# Patient Record
Sex: MALE | Race: WHITE | NOT HISPANIC OR LATINO | Employment: STUDENT | URBAN - METROPOLITAN AREA
[De-identification: names, ages, dates, MRNs, and addresses within clinical notes are randomized per-mention and may not be internally consistent; named-entity substitution may affect disease eponyms.]

---

## 2017-01-17 ENCOUNTER — OFFICE VISIT (OUTPATIENT)
Dept: PSYCHIATRY | Facility: CLINIC | Age: 32
End: 2017-01-17

## 2017-01-17 VITALS
HEART RATE: 132 BPM | SYSTOLIC BLOOD PRESSURE: 160 MMHG | DIASTOLIC BLOOD PRESSURE: 99 MMHG | BODY MASS INDEX: 46.05 KG/M2 | WEIGHT: 294 LBS

## 2017-01-17 DIAGNOSIS — F31.32 BIPOLAR AFFECTIVE DISORDER, CURRENTLY DEPRESSED, MODERATE: Primary | ICD-10-CM

## 2017-01-17 DIAGNOSIS — F11.20: ICD-10-CM

## 2017-01-17 DIAGNOSIS — F19.20: ICD-10-CM

## 2017-01-17 PROCEDURE — 90833 PSYTX W PT W E/M 30 MIN: CPT | Mod: PBBFAC | Performed by: PSYCHIATRY & NEUROLOGY

## 2017-01-17 PROCEDURE — 99213 OFFICE O/P EST LOW 20 MIN: CPT | Mod: S$PBB,,, | Performed by: PSYCHIATRY & NEUROLOGY

## 2017-01-17 PROCEDURE — 99999 PR PBB SHADOW E&M-EST. PATIENT-LVL III: CPT | Mod: PBBFAC,,, | Performed by: PSYCHIATRY & NEUROLOGY

## 2017-01-17 PROCEDURE — 99213 OFFICE O/P EST LOW 20 MIN: CPT | Mod: PBBFAC | Performed by: PSYCHIATRY & NEUROLOGY

## 2017-01-17 PROCEDURE — 90833 PSYTX W PT W E/M 30 MIN: CPT | Mod: S$PBB,,, | Performed by: PSYCHIATRY & NEUROLOGY

## 2017-01-17 RX ORDER — CITALOPRAM 20 MG/1
20 TABLET, FILM COATED ORAL DAILY
Qty: 90 TABLET | Refills: 0 | Status: SHIPPED | OUTPATIENT
Start: 2017-01-17 | End: 2017-03-14

## 2017-01-17 RX ORDER — LAMOTRIGINE 100 MG/1
100 TABLET ORAL DAILY
Qty: 30 TABLET | Refills: 1 | Status: SHIPPED | OUTPATIENT
Start: 2017-02-18 | End: 2017-03-14 | Stop reason: SDUPTHER

## 2017-01-17 RX ORDER — LAMOTRIGINE 25 MG/1
25 TABLET ORAL DAILY
Qty: 60 TABLET | Refills: 0 | Status: SHIPPED | OUTPATIENT
Start: 2017-01-17 | End: 2017-02-18

## 2017-01-17 NOTE — LETTER
January 24, 2017      Elias Fink MD  [address]  [facility] [address]           [facility] - Child Psychiatry  [address]  [facility] [address]  Phone: [phone]          Patient: Warren Berg   MR Number: [id number]   YOB: 1985   Date of Visit: 1/17/2017       Dear Dr. Elias Fink:    Thank you for referring Warren Berg to me for evaluation. Attached you will find relevant portions of my assessment and plan of care.    If you have questions, please do not hesitate to call me. I look forward to following Warren Berg along with you.    Sincerely,    Alejo Denney MD    Enclosure  CC:  No Recipients    If you would like to receive this communication electronically, please contact [email or web address] or [phone] to request more information on [employer] access.    For providers and/or their staff who would like to refer a patient to [facility], please contact us through our one-stop-shop provider referral line, Luis Daniel Dubon, at [phone].    If you feel you have received this communication in error or would no longer like to receive these types of communications, please e-mail [email or web address]

## 2017-01-17 NOTE — PROGRESS NOTES
"Outpatient Psychiatry Follow-Up Visit (MD/NP)    1/17/2017    Clinical Status of Patient:  Outpatient (Ambulatory)    Chief Complaint:  Warren Berg is a 31 y.o. male who presents today for follow-up of Mood Disorder and Mood Intensity dysregulation    Met with patient.      Interval History and Content of Current Session:           Patient reports depression has recurred: depressed mood, loss of please, no energy, sleeping over 12 hours a day, feeling helpless to do much about this. (Even his new photo for this EHR, taken today, looks depressed.) Denies any suicidal thoughts, "I've been through too much already to do that," no aggressive thoughts, and no hopelessness. We agree that this is a recurrence for him despite lamotrigine prophylaxis and agree to resume Emsam that has been very helpful in recovering him in the past. Tells me he still has access to the Patient Assistance Program from the . Inquires whether I am willing to increase Adderall to improve his energy and I tell him I am not. He is accepting of same. We discussed safety procedures and access to emergency services if needed.         Pertinent stressor is a relationship loss with GF of something between 6-12 months. He did not see this coming.        Re prompt reassessment, he says his mother has an appt in this clinic next week and I agree to see him the same day and time due to their very long transportation time here.    PSYCHOTHERAPY  Site: [facility], [address]  Time: 21 minutes  Participants: Met with patient  Therapeutic Intervention Type: supportive psychotherapy  Why chosen therapy is appropriate versus another modality: patient responds to this modality  Target symptoms: distractability, anxiety , mood disorder  Primary focus: resumption of care  Psychotherapeutic techniques: ventilation, clarification  Outcome monitoring methods: self-report, observation, feedback from family  Patient's response to " intervention:  The patient's response to intervention is motivated.  Progress toward goals:  The patient's progress toward goals is limited.    Past Medical History   Diagnosis Date    ADD (attention deficit disorder) 7/27/2012    ADHD (attention deficit hyperactivity disorder)     Bipolar affective disorder, depressed in partial remission     Angela     Migraine     Psychiatric exam      Medications  Current Outpatient Prescriptions on File Prior to Visit   Medication Sig Dispense Refill    hydrochlorothiazide (HYDRODIURIL) 25 MG tablet Take 25 mg by mouth once daily.        losartan (COZAAR) 100 MG tablet   5    triamterene (DYRENIUM) 50 MG Cap Take 50 mg by mouth once daily.        [DISCONTINUED] lamotrigine (LAMICTAL) 200 MG tablet Take 1 tablet (200 mg total) by mouth once daily. 30 tablet 3     No current facility-administered medications on file prior to visit.        Compliance: In care home (up til 2 days ago) he was taking lamotrigine 100 mg daily. Since retturn home he has been taking the meds as prescribed here.  Side effects: None    Risk Parameters:  Patient reports no suicidal ideation  Patient reports no homicidal ideation  Patient reports no self-injurious behavior  Patient reports no violent behavior    EXAM:   weight is 133.4 kg (294 lb). His blood pressure is 160/99 (abnormal) and his pulse is 132 (abnormal).     Mental Status Evaluation     Appearance:  Obese, casually but neatly dressed today, full beard   Behavior:  cooperative   Speech:  normal rate, normal volume, spontaneous   Mood:  anxious   Affect:  congruent and appropriate   Thought Process:  normal and logical, goal-directed   Thought Content:  normal, no suicidality, no homicidality, delusions, or paranoia   Sensorium:  person, place, time/date, situation   Attention Span & Concentration able to focus, completed tasks   Cognition:  grossly intact   Insight:  poor   Judgment:  impaired due to impulsivity and denial      Diagnosis:  Axis I:   1. Bipolar affective disorder, currently depressed, moderate  lamotrigine (LAMICTAL) 25 MG tablet   2. 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    Plan:  · Medication Management:  Continue current medications. The risks and benefits of medication were discussed with the patient. Add new (old, actually but has been off it for over a year) Emsam 6 mg daily, increase to 9 mg next week if he is tolerating it   · Supportive psychotherapy  · Get collateral from mother at next visit, assure safety net     Return to Clinic: 2 months

## 2017-01-17 NOTE — MR AVS SNAPSHOT
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       These Medications        Disp Refills Start End    lamotrigine (LAMICTAL) 25 MG tablet 60 tablet 0 2017    Take 1 tablet (25 mg total) by mouth once daily. For 14 days, then 2 tablets daily for 14 days, then 4 tablets daily - Oral    [facility] [address] [place] [address] [address] [place] [address] Ph #: [phone]       lamotrigine (LAMICTAL) 100 MG tablet 30 tablet 1 2017     Take 1 tablet (100 mg total) by mouth once daily. Starting on 2017 - Oral    [facility] [address] [place] [address] [place] [address] Ph #: [phone]       citalopram (CELEXA) 20 MG tablet 90 tablet 0 2017     Take 1 tablet (20 mg total) by mouth once daily. - Oral    Pharmacy: [facility] [address] [place] [address] [place] - [address] [place] [address] Ph #: [phone]         [facility] On Call     [facility] On Call Nurse Care Line -  Assistance  Registered nurses in the [facility] On Call Center provide clinical advisement, health education, appointment booking, and other advisory services.  Call for this free service at [phone].              Medications           Message regarding Medications     Verify the changes and/or additions to your medication regime listed below are the same as discussed with your clinician today.  If any of these changes or additions are incorrect, please notify your healthcare provider.        START taking these NEW medications        Refills    lamotrigine (LAMICTAL) 100 MG tablet 1    Starting on: 2/18/2017    Sig: Take 1 tablet (100 mg total) by mouth once daily. Starting on 2/18/2017    Class: Normal    Route: Oral    citalopram (CELEXA) 20 MG tablet 0    Sig: Take 1 tablet (20 mg total) by mouth once daily.    Class: Normal    Route: Oral      CHANGE how you are taking these medications     Start Taking Instead of    lamotrigine (LAMICTAL) 25 MG tablet lamotrigine (LAMICTAL) 200 MG tablet    Dosage:  Take 1 tablet (25 mg total) by mouth once daily. For 14 days, then 2 tablets daily for 14 days, then 4 tablets daily Dosage:  Take 1 tablet (200 mg total) by mouth once daily.    Reason for Change:  Reorder       STOP taking these medications     alprazolam (XANAX) 2 MG Tab 1 tab by mouth at 10 am, one tab at 4 pm, and 2 tabs at bedtime    selegiline (EMSAM) 9 mg/24 hr Place 1 patch onto the skin once daily.           Verify that the below list of medications is an accurate representation of the medications you are currently taking.  If none reported, the list may be blank. If incorrect, please contact your healthcare provider. Carry this list with you in case of emergency.           Current Medications     citalopram (CELEXA) 20 MG tablet Take 1 tablet (20 mg total) by mouth once daily.    hydrochlorothiazide (HYDRODIURIL) 25 MG tablet Take 25 mg by mouth once daily.      lamotrigine (LAMICTAL) 100 MG tablet Starting on Feb 18, 2017. Take 1 tablet (100 mg total) by mouth once daily. Starting on 2/18/2017    lamotrigine (LAMICTAL) 25 MG tablet Take 1 tablet (25 mg total) by mouth once daily. For 14 days, then 2 tablets  daily for 14 days, then 4 tablets daily    losartan (COZAAR) 100 MG tablet     triamterene (DYRENIUM) 50 MG Cap Take 50 mg by mouth once daily.             Clinical Reference Information           Vital Signs - Last Recorded  Most recent update: 1/17/2017  1:32 PM by Ward Cannon    BP Pulse Wt BMI       (!) 160/99 (!) 132 133.4 kg (294 lb) 46.05 kg/m2       Blood Pressure          Most Recent Value    BP  (!)  160/99      Allergies as of 1/17/2017     No Known Allergies      Immunizations Administered on Date of Encounter - 1/17/2017     None      [facility] Sign-Up     Activating your [facility] account is as easy as 1-2-3!     1) Visit [email or web address], select Sign Up Now, enter this activation code and your date of birth, then select Next.  [email or web address]  Expires: 3/3/2017  2:06 PM      2) Create a username and password to use when you visit [facility] in the future and select a security question in case you lose your password and select Next.    3) Enter your e-mail address and click Sign Up!    Additional Information  If you have questions, please e-mail [email or web address] or call [phone] to talk to our [facility] staff. Remember, [facility] is NOT to be used for urgent needs. For medical emergencies, dial 911.

## 2017-03-14 ENCOUNTER — OFFICE VISIT (OUTPATIENT)
Dept: PSYCHIATRY | Facility: CLINIC | Age: 32
End: 2017-03-14

## 2017-03-14 VITALS
HEART RATE: 113 BPM | DIASTOLIC BLOOD PRESSURE: 81 MMHG | SYSTOLIC BLOOD PRESSURE: 135 MMHG | WEIGHT: 301 LBS | HEIGHT: 67 IN | BODY MASS INDEX: 47.24 KG/M2

## 2017-03-14 DIAGNOSIS — F19.21 POLYSUBSTANCE DEPENDENCE, NON-OPIOID, IN REMISSION: ICD-10-CM

## 2017-03-14 DIAGNOSIS — F31.75 BIPOLAR DISORDER, IN PARTIAL REMISSION, MOST RECENT EPISODE DEPRESSED: Primary | ICD-10-CM

## 2017-03-14 PROBLEM — F31.70 BIPOLAR DISORDER IN PARTIAL REMISSION: Status: ACTIVE | Noted: 2017-03-14

## 2017-03-14 PROCEDURE — 90833 PSYTX W PT W E/M 30 MIN: CPT | Mod: PBBFAC | Performed by: PSYCHIATRY & NEUROLOGY

## 2017-03-14 PROCEDURE — 99213 OFFICE O/P EST LOW 20 MIN: CPT | Mod: S$PBB,,, | Performed by: PSYCHIATRY & NEUROLOGY

## 2017-03-14 PROCEDURE — 99999 PR PBB SHADOW E&M-EST. PATIENT-LVL III: CPT | Mod: PBBFAC,,, | Performed by: PSYCHIATRY & NEUROLOGY

## 2017-03-14 PROCEDURE — 90833 PSYTX W PT W E/M 30 MIN: CPT | Mod: S$PBB,,, | Performed by: PSYCHIATRY & NEUROLOGY

## 2017-03-14 PROCEDURE — 99213 OFFICE O/P EST LOW 20 MIN: CPT | Mod: PBBFAC | Performed by: PSYCHIATRY & NEUROLOGY

## 2017-03-14 RX ORDER — RIZATRIPTAN BENZOATE 10 MG/1
TABLET, ORALLY DISINTEGRATING ORAL
Refills: 0 | COMMUNITY
Start: 2016-12-05 | End: 2019-09-04 | Stop reason: ALTCHOICE

## 2017-03-14 RX ORDER — LAMOTRIGINE 150 MG/1
150 TABLET ORAL DAILY
Qty: 30 TABLET | Refills: 5 | Status: SHIPPED | OUTPATIENT
Start: 2017-03-14 | End: 2017-08-17 | Stop reason: SDUPTHER

## 2017-03-14 RX ORDER — MIRTAZAPINE 30 MG/1
30 TABLET, FILM COATED ORAL NIGHTLY
Qty: 30 TABLET | Refills: 5 | Status: SHIPPED | OUTPATIENT
Start: 2017-03-14 | End: 2017-05-15 | Stop reason: SINTOL

## 2017-03-14 NOTE — MR AVS SNAPSHOT
[facility] Child Psychiatry  [address]  [facility] [address]  Phone: [phone]                  Warren Berg   3/14/2017 1:30 PM   Office Visit    Description:  Male : 1985   Provider:  Alejo Denney MD   Department:  [facility] - Child Psychiatry           Reason for Visit     Mood Disorder     Anxiety           Diagnoses this Visit        Comments    Bipolar disorder, in partial remission, most recent episode depressed    -  Primary     Polysubstance dependence, non-opioid, in remission                To Do List           Goals (5 Years of Data)     None      Follow-Up and Disposition     Return in about 2 months (around 2017) for evaluation and management with psychotherapy.       These Medications        Disp Refills Start End    lamotrigine (LAMICTAL) 150 MG Tab 30 tablet 5 3/14/2017     Take 1 tablet (150 mg total) by mouth once daily. - Oral    [facility] [address] [facility] Ph #: [phone]       mirtazapine (REMERON) 30 MG tablet 30 tablet 5 3/14/2017     Take 1 tablet (30 mg total) by mouth every evening. - Oral    Pharmacy: [place], [place] - [address] [facility] Ph #: [phone]         [facility] On Call     [facility] On Call Nurse Care Line -  Assistance  Registered nurses in the [facility] On Call Center provide clinical advisement, health education, appointment booking, and other advisory services.  Call for this free service at [phone].             Medications           Message regarding Medications     Verify the changes and/or additions to your medication regime listed below are the same as discussed with your clinician today.  If any of these changes or additions are incorrect, please notify your healthcare provider.        START taking these NEW medications        Refills    mirtazapine (REMERON) 30 MG tablet 5    Sig: Take 1 tablet (30 mg total) by mouth every evening.    Class: Normal    Route:  "Oral      CHANGE how you are taking these medications     Start Taking Instead of    lamotrigine (LAMICTAL) 150 MG Tab lamotrigine (LAMICTAL) 100 MG tablet    Dosage:  Take 1 tablet (150 mg total) by mouth once daily. Dosage:  Take 1 tablet (100 mg total) by mouth once daily. Starting on 2/18/2017    Reason for Change:  Reorder       STOP taking these medications     citalopram (CELEXA) 20 MG tablet Take 1 tablet (20 mg total) by mouth once daily.           Verify that the below list of medications is an accurate representation of the medications you are currently taking.  If none reported, the list may be blank. If incorrect, please contact your healthcare provider. Carry this list with you in case of emergency.           Current Medications     hydrochlorothiazide (HYDRODIURIL) 25 MG tablet Take 25 mg by mouth once daily.      lamotrigine (LAMICTAL) 150 MG Tab Take 1 tablet (150 mg total) by mouth once daily.    losartan (COZAAR) 100 MG tablet     mirtazapine (REMERON) 30 MG tablet Take 1 tablet (30 mg total) by mouth every evening.    rizatriptan (MAXALT-MLT) 10 MG disintegrating tablet     triamterene (DYRENIUM) 50 MG Cap Take 50 mg by mouth once daily.             Clinical Reference Information           Your Vitals Were     BP Pulse Height Weight BMI    135/81 113 5' 7" (1.702 m) 136.5 kg (301 lb) 47.14 kg/m2      Blood Pressure          Most Recent Value    BP  135/81      Allergies as of 3/14/2017     No Known Allergies      Immunizations Administered on Date of Encounter - 3/14/2017     None      [facility] Sign-Up     Activating your [facility] account is as easy as 1-2-3!     1) Visit [email or web address], select Sign Up Now, enter this activation code and your date of birth, then select Next.  [email or web address]  Expires: 4/28/2017  2:03 PM      2) Create a username and password to use when you visit [facility] in the future and select a security question in case you lose your password and select Next.    3) Enter " your e-mail address and click Sign Up!    Additional Information  If you have questions, please e-mail [email or web address] or call [phone] to talk to our [facility] staff. Remember, [facility] is NOT to be used for urgent needs. For medical emergencies, dial 911.         Language Assistance Services     ATTENTION: Language assistance services are available, free of charge. Please call [phone].      ATENCIÓN: Si habla español, tiene a gaffney disposición servicios gratuitos de asistencia lingüística. Llame al [phone].     CHÚ Ý: N?u b?n nói Ti?ng Vi?t, có các d?ch v? h? tr? ngôn ng? mi?n phí dành cho b?n. G?i s? [phone].         [facility] complies with applicable Federal civil rights laws and does not discriminate on the basis of race, color, national origin, age, disability, or sex.

## 2017-03-14 NOTE — PROGRESS NOTES
"Outpatient Psychiatry Follow-Up Visit (MD/NP)    3/14/2017    Clinical Status of Patient:  Outpatient (Ambulatory)    Chief Complaint:  Warren Berg is a 31 y.o. male who presents today for follow-up of Mood Disorder and Anxiety    Met with patient.      Interval History and Content of Current Session:           Patient reports depression has recurred: depressed mood, loss of please, no energy, sleeping over 12 hours a day, feeling helpless to do much about this. (Even his new photo for this EHR, taken today, looks depressed.) Denies any suicidal thoughts, "I've been through too much already to do that," no aggressive thoughts, and no hopelessness. We agree that this is a recurrence for him despite lamotrigine prophylaxis and agree to resume Emsam that has been very helpful in recovering him in the past. Tells me he still has access to the Patient Assistance Program from the . Inquires whether I am willing to increase Adderall to improve his energy and I tell him I am not. He is accepting of same. We discussed safety procedures and access to emergency services if needed.         Pertinent stressor is a relationship loss with GF of something between 6-12 months. He did not see this coming.        Re prompt reassessment, he says his mother has an appt in this clinic next week and I agree to see him the same day and time due to their very long transportation time here.    PSYCHOTHERAPY  Site: [facility] [address]  Time: 21 minutes  Participants: Met with patient  Therapeutic Intervention Type: supportive psychotherapy  Why chosen therapy is appropriate versus another modality: patient responds to this modality  Target symptoms: distractability, anxiety , mood disorder  Primary focus: resumption of care  Psychotherapeutic techniques: ventilation, clarification  Outcome monitoring methods: self-report, observation, feedback from family  Patient's response to intervention:  The patient's " "response to intervention is motivated.  Progress toward goals:  The patient's progress toward goals is limited.    Past Medical History:   Diagnosis Date    ADD (attention deficit disorder) 7/27/2012    ADHD (attention deficit hyperactivity disorder)     Bipolar affective disorder, depressed in partial remission     Angela     Migraine     Psychiatric exam      Medications  Current Outpatient Prescriptions on File Prior to Visit   Medication Sig Dispense Refill    hydrochlorothiazide (HYDRODIURIL) 25 MG tablet Take 25 mg by mouth once daily.        losartan (COZAAR) 100 MG tablet   5    triamterene (DYRENIUM) 50 MG Cap Take 50 mg by mouth once daily.        [DISCONTINUED] citalopram (CELEXA) 20 MG tablet Take 1 tablet (20 mg total) by mouth once daily. 90 tablet 0    [DISCONTINUED] lamotrigine (LAMICTAL) 100 MG tablet Take 1 tablet (100 mg total) by mouth once daily. Starting on 2/18/2017 30 tablet 1     No current facility-administered medications on file prior to visit.        Compliance: taking as prescribed, he says  Side effects: None    Risk Parameters:  Patient reports no suicidal ideation  Patient reports no homicidal ideation  Patient reports no self-injurious behavior  Patient reports no violent behavior    EXAM:   height is 5' 7" (1.702 m) and weight is 136.5 kg (301 lb) (abnormal). His blood pressure is 135/81 and his pulse is 113 (abnormal).     Mental Status Evaluation     Appearance:  Obese, casually but neatly dressed today, therese and hair well groomed   Behavior:  cooperative, mildly manipulative   Speech:  spontaneous, conversational rate and volume   Mood:  anxious   Affect:  congruent and appropriate   Thought Process:  normal and logical, goal-directed   Thought Content:  normal, no suicidality, no homicidality, delusions, or paranoia   Sensorium:  person, place, time/date, situation   Attention Span & Concentration able to focus, completed tasks   Cognition:  grossly intact "   Insight:  poor   Judgment:  impaired due to impulsivity and denial     Diagnosis:  Axis I:   1. Bipolar disorder, in partial remission, most recent episode depressed  lamotrigine (LAMICTAL) 150 MG Tab    mirtazapine (REMERON) 30 MG tablet   2. 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    Plan:  · Medication Management:  The risks and benefits of medication were discussed with the patient. 1. increase lamotrigine to 150 mg daily 2. discontinue citalopram 3. trial mirtazapine 30 mg daily   · Supportive psychotherapy  · Collateral from mother reviewed: support system of friends and household is substantially improved    Return to Clinic: 2 months

## 2017-05-15 ENCOUNTER — OFFICE VISIT (OUTPATIENT)
Dept: PSYCHIATRY | Facility: CLINIC | Age: 32
End: 2017-05-15
Payer: MEDICAID

## 2017-05-15 VITALS
SYSTOLIC BLOOD PRESSURE: 180 MMHG | HEART RATE: 104 BPM | WEIGHT: 306 LBS | DIASTOLIC BLOOD PRESSURE: 117 MMHG | HEIGHT: 67 IN | BODY MASS INDEX: 48.03 KG/M2

## 2017-05-15 DIAGNOSIS — F19.21 POLYSUBSTANCE DEPENDENCE, NON-OPIOID, IN REMISSION: ICD-10-CM

## 2017-05-15 DIAGNOSIS — F10.10 ALCOHOL ABUSE: ICD-10-CM

## 2017-05-15 DIAGNOSIS — F98.8 ADD (ATTENTION DEFICIT DISORDER): Chronic | ICD-10-CM

## 2017-05-15 DIAGNOSIS — F31.75 BIPOLAR DISORDER, IN PARTIAL REMISSION, MOST RECENT EPISODE DEPRESSED: Primary | ICD-10-CM

## 2017-05-15 PROCEDURE — 90833 PSYTX W PT W E/M 30 MIN: CPT | Mod: S$PBB,HB,AF, | Performed by: PSYCHIATRY & NEUROLOGY

## 2017-05-15 PROCEDURE — 99213 OFFICE O/P EST LOW 20 MIN: CPT | Mod: PBBFAC | Performed by: PSYCHIATRY & NEUROLOGY

## 2017-05-15 PROCEDURE — 90833 PSYTX W PT W E/M 30 MIN: CPT | Mod: PBBFAC | Performed by: PSYCHIATRY & NEUROLOGY

## 2017-05-15 PROCEDURE — 99999 PR PBB SHADOW E&M-EST. PATIENT-LVL III: CPT | Mod: PBBFAC,,, | Performed by: PSYCHIATRY & NEUROLOGY

## 2017-05-15 PROCEDURE — 99213 OFFICE O/P EST LOW 20 MIN: CPT | Mod: S$PBB,HB,AF, | Performed by: PSYCHIATRY & NEUROLOGY

## 2017-05-15 RX ORDER — DULOXETIN HYDROCHLORIDE 60 MG/1
60 CAPSULE, DELAYED RELEASE ORAL DAILY
Qty: 30 CAPSULE | Refills: 2 | Status: SHIPPED | OUTPATIENT
Start: 2017-06-13 | End: 2017-08-17

## 2017-05-15 RX ORDER — DULOXETIN HYDROCHLORIDE 30 MG/1
30 CAPSULE, DELAYED RELEASE ORAL DAILY
Qty: 30 CAPSULE | Refills: 0 | Status: SHIPPED | OUTPATIENT
Start: 2017-05-15 | End: 2017-06-14
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
## 2017-05-15 NOTE — MR AVS SNAPSHOT
[facility] - Child Psychiatry  [address]  [facility] [address]  Phone: [phone]                  Warren Berg   5/15/2017 10:30 AM   Office Visit    Description:  Male : 1985   Provider:  Alejo Denney MD   Department:  [facility] - Child Psychiatry           Reason for Visit     Mood Disorder           Diagnoses this Visit        Comments    Bipolar disorder, in partial remission, most recent episode depressed    -  Primary     ADD (attention deficit disorder)         Polysubstance dependence, non-opioid, in remission                To Do List           Goals (5 Years of Data)     None      Follow-Up and Disposition     Return in about 6 weeks (around 2017) for f/u of medication and developmental progress.       These Medications        Disp Refills Start End    duloxetine (CYMBALTA) 30 MG capsule 30 capsule 0 5/15/2017 2017    Take 1 capsule (30 mg total) by mouth once daily. - Oral    Pharmacy: [facility] [place] - [address] [facility] #: [phone]       duloxetine (CYMBALTA) 60 MG capsule 30 capsule 2 2017     Take 1 capsule (60 mg total) by mouth once daily. Starting 2017 - Oral    Pharmacy: [place], [place] - [address] [facility] #: [phone]         [facility]     [facility] Care Line -  Assistance  Unless otherwise directed by your provider, please contact [facility], our nurse care line that is available for  assistance.     Registered nurses in the [facility] provide: appointment scheduling, clinical advisement, health education, and other advisory services.  Call: [phone] (toll free)               Medications           Message regarding Medications     Verify the changes and/or additions to your medication regime listed below are the same as discussed with your clinician today.  If any of these changes or additions are incorrect, please notify your healthcare  "provider.        START taking these NEW medications        Refills    duloxetine (CYMBALTA) 30 MG capsule 0    Sig: Take 1 capsule (30 mg total) by mouth once daily.    Class: Normal    Route: Oral    duloxetine (CYMBALTA) 60 MG capsule 2    Starting on: 6/13/2017    Sig: Take 1 capsule (60 mg total) by mouth once daily. Starting 6/13/2017    Class: Normal    Route: Oral      STOP taking these medications     mirtazapine (REMERON) 30 MG tablet Take 1 tablet (30 mg total) by mouth every evening.           Verify that the below list of medications is an accurate representation of the medications you are currently taking.  If none reported, the list may be blank. If incorrect, please contact your healthcare provider. Carry this list with you in case of emergency.           Current Medications     duloxetine (CYMBALTA) 30 MG capsule Take 1 capsule (30 mg total) by mouth once daily.    duloxetine (CYMBALTA) 60 MG capsule Starting on Jun 13, 2017. Take 1 capsule (60 mg total) by mouth once daily. Starting 6/13/2017    hydrochlorothiazide (HYDRODIURIL) 25 MG tablet Take 25 mg by mouth once daily.      lamotrigine (LAMICTAL) 150 MG Tab Take 1 tablet (150 mg total) by mouth once daily.    losartan (COZAAR) 100 MG tablet     rizatriptan (MAXALT-MLT) 10 MG disintegrating tablet     triamterene (DYRENIUM) 50 MG Cap Take 50 mg by mouth once daily.             Clinical Reference Information           Your Vitals Were     BP Pulse Height Weight BMI    180/117 104 5' 7" (1.702 m) 138.8 kg (306 lb) 47.93 kg/m2      Blood Pressure          Most Recent Value    BP  (!)  180/117      Allergies as of 5/15/2017     No Known Allergies      Immunizations Administered on Date of Encounter - 5/15/2017     None      [facility] Sign-Up     Activating your [facility] account is as easy as 1-2-3!     1) Visit [email or web address], select Sign Up Now, enter this activation code and your date of birth, then select Next.  [email or web address]  Expires: " 6/29/2017 11:03 AM      2) Create a username and password to use when you visit [facility] in the future and select a security question in case you lose your password and select Next.    3) Enter your e-mail address and click Sign Up!    Additional Information  If you have questions, please e-mail [email or web address] or call [phone] to talk to our [facility] staff. Remember, [facility] is NOT to be used for urgent needs. For medical emergencies, dial 911.         Language Assistance Services     ATTENTION: Language assistance services are available, free of charge. Please call [phone].      ATENCIÓN: Si habla miguel angel, tiene a gaffney disposición servicios gratuitos de asistencia lingüística. Llame al [phone].     CHÚ Ý: N?u b?n nói Ti?ng Vi?t, có các d?ch v? h? tr? ngôn ng? mi?n phí dành cho b?n. G?i s? [phone].         [facility] complies with applicable Federal civil rights laws and does not discriminate on the basis of race, color, national origin, age, disability, or sex.

## 2017-08-17 ENCOUNTER — OFFICE VISIT (OUTPATIENT)
Dept: PSYCHIATRY | Facility: CLINIC | Age: 32
End: 2017-08-17
Payer: MEDICAID

## 2017-08-17 DIAGNOSIS — F90.9 ATTENTION DEFICIT HYPERACTIVITY DISORDER (ADHD), UNSPECIFIED ADHD TYPE: Primary | Chronic | ICD-10-CM

## 2017-08-17 DIAGNOSIS — F19.21 POLYSUBSTANCE DEPENDENCE, NON-OPIOID, IN REMISSION: ICD-10-CM

## 2017-08-17 DIAGNOSIS — F31.75 BIPOLAR DISORDER, IN PARTIAL REMISSION, MOST RECENT EPISODE DEPRESSED: ICD-10-CM

## 2017-08-17 PROCEDURE — 99214 OFFICE O/P EST MOD 30 MIN: CPT | Mod: AF,HB,S$PBB, | Performed by: PSYCHIATRY & NEUROLOGY

## 2017-08-17 PROCEDURE — 3008F BODY MASS INDEX DOCD: CPT | Mod: ,,, | Performed by: PSYCHIATRY & NEUROLOGY

## 2017-08-17 RX ORDER — LAMOTRIGINE 150 MG/1
TABLET ORAL
Qty: 60 TABLET | Refills: 2 | Status: SHIPPED | OUTPATIENT
Start: 2017-08-17 | End: 2017-11-02 | Stop reason: SDUPTHER

## 2017-08-17 NOTE — PROGRESS NOTES
Outpatient Psychiatry Follow-Up Visit (MD/NP)    8/17/2017    Clinical Status of Patient:  Outpatient (Ambulatory)    Chief Complaint:  Warren Berg is a 31 y.o. male who presents today for follow-up of bipolar disorder, adhd, substance use disorders. .  Met with patient and his mother.      Interval History and Content of Current Session:  Reviewed records from Dr. Alejo Denney who has been patient's psychiatrist since ~2001, last seen in May '17, though records available at this visit only available back to 2012. Patient has also received some mental health care during incarceration. Patient reported desire to switch psychiatrists due to ongoing symptoms, conflict with regard to patient's preferred treatments due to history of substance abuse and drug convictions (first for meth manufacturing, though they say he was convicted for roommate's conduct, and 2nd charge a drug accessory charge that was later dropped). Patient reports problems with chronic mood instability, attention and concentration back to childhood, distractibility, panic attacks, organization problems, difficulty completing tasks, anergia, poor motivation, depression, sleep disturbance. He reported drinking too much at last psych visit, occasional MJ use, denies those to me. Reports most recent meds were lamotrigine, duloxetine, that these medications have been helpful but that he's hoping to restart a psychostimulant dexadrine which Dr. Denney has not been agreeable to restart (Dr. Denney's recent notes don't reflect document why specifically but last script seems to have come in February 2016). Also takes losartan, HCTZ, regularly, maxalt prn. In addition to ongoing inattention and related symptoms as above he also endorses problems with irritability and depression, that these have previously been helped with lamotrigine at higher doses.     Review of Systems   Review of Systems   Constitutional: Negative for chills, fever,  malaise/fatigue and weight loss.   HENT: Negative.    Eyes: Negative.    Respiratory: Negative for cough, shortness of breath and wheezing.    Cardiovascular: Negative for chest pain.   Gastrointestinal: Negative for abdominal pain, constipation, diarrhea, heartburn, nausea and vomiting.   Musculoskeletal: Negative for myalgias.   Skin: Negative for rash.   Neurological: Negative for dizziness, tremors, focal weakness and headaches.   Psychiatric/Behavioral: Positive for depression. Negative for hallucinations, substance abuse and suicidal ideas. The patient is nervous/anxious.        Past Medical, Family and Social History: The patient's past medical, family and social history have been reviewed and updated as appropriate within the electronic medical record - see encounter notes.    Compliance: yes    Side effects: None    Risk Parameters:  Patient reports no suicidal ideation  Patient reports no homicidal ideation  Patient reports no self-injurious behavior  Patient reports no violent behavior    Exam (detailed: at least 9 elements; comprehensive: all 15 elements)   Constitutional  Vitals:  Most recent vital signs, dated less than 90 days prior to this appointment, were not reviewed.   There were no vitals filed for this visit.     General:  unremarkable, age appropriate     Musculoskeletal  Muscle Strength/Tone:  no tremor, no tic   Gait & Station:  non-ataxic     Psychiatric  Appearance: casually dressed & groomed;   Behavior: calm,   Cooperation: cooperative with assessment  Speech: normal rate, volume, tone  Thought Process: linear, goal-directed  Thought Content: No suicidal or homicidal ideation; no delusions  Affect: normal range  Mood: euthymic  Perceptions: No auditory or visual hallucinations  Level of Consciousness: alert throughout interview  Insight: fair  Cognition: Oriented to person, place, time, & situation  Memory: no apparent deficits to general clinical interview; not formally  assessed  Attention/Concentration: no apparent deficits to general clinical interview; not formally assessed  Fund of Knowledge: average by vocabulary/education    Assessment and Diagnosis   Status/Progress: Based on the examination today, the patient's problem(s) is/are inadequately controlled.  New problems have not been presented today.   Co-morbidities and Diagnostic uncertainty are complicating management of the primary condition.  The working differential for this patient includes bipolar disorder, adhd, anxiety disorders, personality disorders, substance use disorders.     General Impression: This 32 y/o patient with past history of bipolar disorder, adhd, substance use disorders (most recently alcohol by chart) presents for transfer of care, hoping to restart dexadrine, alprazolam. Explained that given hx of two drug-related arrests including one for meth manufacturing would need to speak to Dr. Denney who he reports has been his psychiatrist since he's been a teen (except for periods of time when he's been incarcerated). Increasing lamotrgine today seems reasonsable. Discussed risks, benefits and alternatives to this and he's agreeable to trial of increased dose. He agrees to look for new rash, stop the medication, contact me should one develop.     Dx: bipolar disorder, adhd, substance use disorders by hx    Intervention/Counseling/Treatment Plan   · Lamotrigine 150 mg daily x 14 days then 200 mg daily. Stop lamotrigine for any non-focal rash, contact me. He expressed understanding and agreement with plan as discussed.   · Defer other meds for now pending discussion with Dr. Denney.   · Follow-up in 1-2 months. About 1 hour after this visit, patient called our offices, told assistant that he would be seeking care elsewhere, to cancel his follow-up visit.     Return to Clinic: 1- 2 months    NATALEE Brothers MD  Psychiatry  [facility]  [address] [place]  [address]  [phone]

## 2017-11-02 ENCOUNTER — OFFICE VISIT (OUTPATIENT)
Dept: PSYCHIATRY | Facility: CLINIC | Age: 32
End: 2017-11-02
Payer: MEDICAID

## 2017-11-02 VITALS
HEART RATE: 123 BPM | BODY MASS INDEX: 48.94 KG/M2 | DIASTOLIC BLOOD PRESSURE: 96 MMHG | SYSTOLIC BLOOD PRESSURE: 160 MMHG | HEIGHT: 67 IN | WEIGHT: 311.81 LBS

## 2017-11-02 DIAGNOSIS — F90.9 ATTENTION DEFICIT HYPERACTIVITY DISORDER (ADHD), UNSPECIFIED ADHD TYPE: Chronic | ICD-10-CM

## 2017-11-02 DIAGNOSIS — F31.70 BIPOLAR DISORDER, CURRENTLY IN REMISSION: Primary | ICD-10-CM

## 2017-11-02 DIAGNOSIS — F19.21 POLYSUBSTANCE DEPENDENCE, NON-OPIOID, IN REMISSION: ICD-10-CM

## 2017-11-02 PROCEDURE — 99999 PR PBB SHADOW E&M-EST. PATIENT-LVL III: CPT | Mod: PBBFAC,,, | Performed by: PSYCHIATRY & NEUROLOGY

## 2017-11-02 PROCEDURE — 99213 OFFICE O/P EST LOW 20 MIN: CPT | Mod: AF,HB,S$PBB, | Performed by: PSYCHIATRY & NEUROLOGY

## 2017-11-02 PROCEDURE — 90833 PSYTX W PT W E/M 30 MIN: CPT | Mod: AF,HB,S$PBB, | Performed by: PSYCHIATRY & NEUROLOGY

## 2017-11-02 PROCEDURE — 90833 PSYTX W PT W E/M 30 MIN: CPT | Mod: PBBFAC | Performed by: PSYCHIATRY & NEUROLOGY

## 2017-11-02 PROCEDURE — 99213 OFFICE O/P EST LOW 20 MIN: CPT | Mod: PBBFAC | Performed by: PSYCHIATRY & NEUROLOGY

## 2017-11-02 RX ORDER — LAMOTRIGINE 200 MG/1
400 TABLET ORAL DAILY
Qty: 60 TABLET | Refills: 2 | Status: SHIPPED | OUTPATIENT
Start: 2017-11-02 | End: 2018-01-30 | Stop reason: SDUPTHER

## 2017-11-02 RX ORDER — LAMOTRIGINE 150 MG/1
300 TABLET ORAL DAILY
Qty: 60 TABLET | Refills: 2 | Status: SHIPPED | OUTPATIENT
Start: 2017-11-02 | End: 2017-11-02 | Stop reason: SDUPTHER

## 2017-11-02 RX ORDER — SILDENAFIL CITRATE 20 MG/1
TABLET ORAL
Refills: 2 | COMMUNITY
Start: 2017-09-22 | End: 2019-09-04 | Stop reason: ALTCHOICE

## 2017-11-02 RX ORDER — CLONAZEPAM 0.25 MG/1
0.25 TABLET, ORALLY DISINTEGRATING ORAL NIGHTLY
Qty: 30 TABLET | Refills: 2 | Status: SHIPPED | OUTPATIENT
Start: 2017-11-02 | End: 2018-01-30 | Stop reason: SDUPTHER
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
## 2018-01-30 ENCOUNTER — OFFICE VISIT (OUTPATIENT)
Dept: PSYCHIATRY | Facility: CLINIC | Age: 33
End: 2018-01-30
Payer: MEDICAID

## 2018-01-30 VITALS
WEIGHT: 307.63 LBS | DIASTOLIC BLOOD PRESSURE: 90 MMHG | SYSTOLIC BLOOD PRESSURE: 154 MMHG | HEART RATE: 113 BPM | BODY MASS INDEX: 48.28 KG/M2 | HEIGHT: 67 IN

## 2018-01-30 DIAGNOSIS — F19.21 POLYSUBSTANCE DEPENDENCE, NON-OPIOID, IN REMISSION: ICD-10-CM

## 2018-01-30 DIAGNOSIS — F31.31 BIPOLAR AFFECTIVE DISORDER, CURRENTLY DEPRESSED, MILD: Primary | ICD-10-CM

## 2018-01-30 DIAGNOSIS — F90.2 ATTENTION DEFICIT HYPERACTIVITY DISORDER (ADHD), COMBINED TYPE: Chronic | ICD-10-CM

## 2018-01-30 PROCEDURE — 3008F BODY MASS INDEX DOCD: CPT | Mod: ,,, | Performed by: PSYCHIATRY & NEUROLOGY

## 2018-01-30 PROCEDURE — 99999 PR PBB SHADOW E&M-EST. PATIENT-LVL III: CPT | Mod: PBBFAC,,, | Performed by: PSYCHIATRY & NEUROLOGY

## 2018-01-30 PROCEDURE — 90833 PSYTX W PT W E/M 30 MIN: CPT | Mod: PBBFAC | Performed by: PSYCHIATRY & NEUROLOGY

## 2018-01-30 PROCEDURE — 99214 OFFICE O/P EST MOD 30 MIN: CPT | Mod: AF,HB,S$PBB, | Performed by: PSYCHIATRY & NEUROLOGY

## 2018-01-30 PROCEDURE — 90833 PSYTX W PT W E/M 30 MIN: CPT | Mod: AF,HB,S$PBB, | Performed by: PSYCHIATRY & NEUROLOGY

## 2018-01-30 PROCEDURE — 99213 OFFICE O/P EST LOW 20 MIN: CPT | Mod: PBBFAC | Performed by: PSYCHIATRY & NEUROLOGY

## 2018-01-30 RX ORDER — SELEGILINE HYDROCHLORIDE 5 MG/1
5 CAPSULE ORAL 2 TIMES DAILY
Qty: 60 CAPSULE | Refills: 2 | Status: SHIPPED | OUTPATIENT
Start: 2018-01-30 | End: 2018-04-24 | Stop reason: SINTOL

## 2018-01-30 RX ORDER — HYDROCORTISONE VALERATE CREAM 2 MG/G
CREAM TOPICAL
COMMUNITY
Start: 2017-12-30 | End: 2019-09-04

## 2018-01-30 RX ORDER — CLONAZEPAM 0.25 MG/1
0.25 TABLET, ORALLY DISINTEGRATING ORAL NIGHTLY
Qty: 30 TABLET | Refills: 2 | Status: SHIPPED | OUTPATIENT
Start: 2018-01-30 | End: 2018-04-16 | Stop reason: SDUPTHER

## 2018-01-30 RX ORDER — FLUOCINONIDE 0.5 MG/G
CREAM TOPICAL
COMMUNITY
Start: 2017-12-30 | End: 2019-09-04 | Stop reason: ALTCHOICE

## 2018-01-30 RX ORDER — LAMOTRIGINE 200 MG/1
400 TABLET ORAL DAILY
Qty: 60 TABLET | Refills: 2 | Status: SHIPPED | OUTPATIENT
Start: 2018-01-30 | End: 2018-04-16 | Stop reason: SDUPTHER
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
## 2018-04-16 DIAGNOSIS — F31.31 BIPOLAR AFFECTIVE DISORDER, CURRENTLY DEPRESSED, MILD: ICD-10-CM

## 2018-04-16 RX ORDER — LAMOTRIGINE 200 MG/1
400 TABLET ORAL DAILY
Qty: 60 TABLET | Refills: 2 | Status: SHIPPED | OUTPATIENT
Start: 2018-04-16 | End: 2018-04-24 | Stop reason: SDUPTHER

## 2018-04-16 RX ORDER — CLONAZEPAM 0.25 MG/1
0.25 TABLET, ORALLY DISINTEGRATING ORAL NIGHTLY
Qty: 30 TABLET | Refills: 2 | Status: SHIPPED | OUTPATIENT
Start: 2018-04-16 | End: 2018-04-24 | Stop reason: SINTOL

## 2018-04-24 ENCOUNTER — OFFICE VISIT (OUTPATIENT)
Dept: PSYCHIATRY | Facility: CLINIC | Age: 33
End: 2018-04-24
Payer: MEDICAID

## 2018-04-24 VITALS
HEIGHT: 67 IN | DIASTOLIC BLOOD PRESSURE: 107 MMHG | HEART RATE: 127 BPM | WEIGHT: 308.75 LBS | SYSTOLIC BLOOD PRESSURE: 170 MMHG | BODY MASS INDEX: 48.46 KG/M2

## 2018-04-24 DIAGNOSIS — F31.31 BIPOLAR AFFECTIVE DISORDER, CURRENTLY DEPRESSED, MILD: ICD-10-CM

## 2018-04-24 DIAGNOSIS — F31.75 BIPOLAR DISORDER, IN PARTIAL REMISSION, MOST RECENT EPISODE DEPRESSED: Primary | ICD-10-CM

## 2018-04-24 DIAGNOSIS — F19.21 POLYSUBSTANCE DEPENDENCE, NON-OPIOID, IN REMISSION: ICD-10-CM

## 2018-04-24 PROCEDURE — 99213 OFFICE O/P EST LOW 20 MIN: CPT | Mod: PBBFAC | Performed by: PSYCHIATRY & NEUROLOGY

## 2018-04-24 PROCEDURE — 99213 OFFICE O/P EST LOW 20 MIN: CPT | Mod: AF,HB,S$PBB, | Performed by: PSYCHIATRY & NEUROLOGY

## 2018-04-24 PROCEDURE — 90833 PSYTX W PT W E/M 30 MIN: CPT | Mod: PBBFAC | Performed by: PSYCHIATRY & NEUROLOGY

## 2018-04-24 PROCEDURE — 99999 PR PBB SHADOW E&M-EST. PATIENT-LVL III: CPT | Mod: PBBFAC,,, | Performed by: PSYCHIATRY & NEUROLOGY

## 2018-04-24 PROCEDURE — 90833 PSYTX W PT W E/M 30 MIN: CPT | Mod: AF,HB,S$PBB, | Performed by: PSYCHIATRY & NEUROLOGY

## 2018-04-24 RX ORDER — LAMOTRIGINE 200 MG/1
400 TABLET ORAL DAILY
Qty: 60 TABLET | Refills: 5 | Status: SHIPPED | OUTPATIENT
Start: 2018-04-24 | End: 2018-09-18

## 2018-04-24 RX ORDER — ALBUTEROL SULFATE 90 UG/1
AEROSOL, METERED RESPIRATORY (INHALATION)
COMMUNITY
Start: 2018-03-15

## 2018-04-24 RX ORDER — DIAZEPAM 5 MG/1
5 TABLET ORAL 2 TIMES DAILY
Qty: 60 TABLET | Refills: 2 | Status: SHIPPED | OUTPATIENT
Start: 2018-04-24 | End: 2018-05-21 | Stop reason: SDUPTHER

## 2018-04-24 NOTE — PROGRESS NOTES
Outpatient Psychiatry Follow-Up Visit (MD/NP)  4/24/2018    Clinical Status of Patient:  Outpatient (Ambulatory)    Chief Complaint:  Warren Berg is a 32 y.o. male who presents today for follow-up of Mood Disorder and Addiction Problem    Met with patient.      Interval History and Content of Current Session:           Depression attenuated somewhat with selegiline, but he complains that it has caused hair-trigger irritability and excessive anger. He further states that when he takes klonopin even at current very low dose, he is excessively somnolent, falling asleep, he says, in the middle of a conversation. He denies current suicidal ideation, and is hopeful of getting a new job in the [employer] at [employer]. Sleep is okay. Eating to excess is unchanged. Denies problems with concentration or memory, but complains of difficulty managing his time.       We discussed options. I cannot find Emsam anywhere on his Medicaid formuary, even the list of meds that requires prior authorization. Trintellix is available with PA from what I can tell, but I cannot get Covermymeds to recognize his demographics. We agree to order a trial of Trintellix and get the pharmacy to help figure out how/where to send a PA request.    Girlfriend drinking has decreased a lot, he volunteers    Review of Systems   Constitutional: Negative for appetite change.   HENT: Negative for congestion and nosebleeds.    Eyes: Negative for visual disturbance.   Respiratory: Negative for chest tightness.    Cardiovascular: Negative for chest pain and palpitations.   Gastrointestinal: Negative for abdominal pain, constipation and diarrhea.   Endocrine: Negative for cold intolerance, polydipsia and polyuria.   Genitourinary: Negative for difficulty urinating.   Musculoskeletal: Negative for arthralgias and gait problem.   Skin: Negative for rash.   Allergic/Immunologic: Negative for immunocompromised state.   Neurological: Negative for tremors, seizures and  syncope.   Hematological: Does not bruise/bleed easily.   Psychiatric/Behavioral: Negative for agitation, behavioral problems, confusion, hallucinations and self-injury.     PSYCHOTHERAPY  Site: [facility]  Time: 22 minutes  Participants: Met with patient  Therapeutic Intervention Type: supportive psychotherapy  Why chosen therapy is appropriate versus another modality: patient responds to this modality  Target symptoms: depression, relationship dysfunction  Primary focus: depression  Psychotherapeutic techniques: ventilation, clarification, education  Outcome monitoring methods: self-report, observation, feedback from family  Patient's response to intervention:  The patient's response to intervention is  motivated.  Progress toward goals:  The patient's progress toward goals is good.    Past Medical History:   Diagnosis Date    ADD (attention deficit disorder) 7/27/2012    ADHD (attention deficit hyperactivity disorder)     Bipolar affective disorder, depressed in partial remission     Angela     Migraine     Psychiatric exam      Medications  Current Outpatient Prescriptions on File Prior to Visit   Medication Sig Dispense Refill    clonazePAM (KLONOPIN) 0.25 MG TbDL Take 1 tablet (0.25 mg total) by mouth nightly. 30 tablet 2    fluocinonide 0.05% (LIDEX) 0.05 % cream       hydrochlorothiazide (HYDRODIURIL) 25 MG tablet Take 25 mg by mouth once daily.        hydrocortisone (WESTCORT) 0.2 % cream       lamoTRIgine (LAMICTAL) 200 MG tablet Take 2 tablets (400 mg total) by mouth once daily. 60 tablet 2    losartan (COZAAR) 100 MG tablet   5    rizatriptan (MAXALT-MLT) 10 MG disintegrating tablet   0    selegiline (ELDEPRYL) 5 mg Cap Take 1 capsule (5 mg total) by mouth 2 (two) times daily. 60 capsule 2    sildenafil (REVATIO) 20 mg Tab TAKE 1-5 TABLETS DAILY AS NEEDED ERECTILE DYSFUNCTION  2    triamterene (DYRENIUM) 50 MG Cap Take 50 mg by mouth once daily.         No  "current facility-administered medications on file prior to visit.      Compliance: good  Side effects: somnolence    Risk Parameters:  Patient reports no suicidal ideation  Patient reports no homicidal ideation  Patient reports no self-injurious behavior  Patient reports no violent behavior    EXAM:   height is 5' 7" (1.702 m) and weight is 140.1 kg (308 lb 12.1 oz) (abnormal). His blood pressure is 170/107 (abnormal) and his pulse is 127 (abnormal).     Mental Status Evaluation     Appearance:  Obese, casually dressed, fair grooming, and he is extremely obese.     Behavior:  cooperative, mildly manipulative   Speech:  spontaneous, conversational rate and volume   Mood:  dysthymic   Affect:  mood-congruent, restricted   Thought Process:  goal-directed, logical   Thought Content:  normal, no suicidality, no homicidality, delusions, or paranoia   Sensorium:  person, place, time/date, situation   Attention Span & Concentration able to focus, completed tasks   Cognition:  fund of knowledge intact and appropriate to age and level of education   Insight:  fair   Judgment:  Impaired due to longstanding pattern of maadaptive rationalizing, dissembling,     Diagnosis:  1. Bipolar disorder, in partial remission, most recent episode depressed     2. Polysubstance dependence, non-opioid, in remission     4. Personality disorder  III: Hypertension    Plan:  · Medication Management:  The risks and benefits of medication were discussed with the patient. 1. continue lamotrigine 400 mg daily 2. stop selegiline 5 mg po BID due to irritability and excessive anger 3. stop klonopin due to exc essive sedation 4. trial Trintellix 5 mg daily if I can succeed in getting Medicaid approval 5. diazepam 5 mg BID   · He agrees to see his PCP Dr Fink promptly about his BP and pulse  · Supportive psychotherapy  · Collateral from mother reviewed    Return to Clinic: 3 months, sooner prn  "

## 2018-05-21 ENCOUNTER — OFFICE VISIT (OUTPATIENT)
Dept: PSYCHIATRY | Facility: CLINIC | Age: 33
End: 2018-05-21
Payer: MEDICAID

## 2018-05-21 DIAGNOSIS — F31.31 BIPOLAR AFFECTIVE DISORDER, CURRENTLY DEPRESSED, MILD: ICD-10-CM

## 2018-05-21 PROCEDURE — 90833 PSYTX W PT W E/M 30 MIN: CPT | Mod: AF,HB,S$PBB, | Performed by: PSYCHIATRY & NEUROLOGY

## 2018-05-21 PROCEDURE — 99999 PR PBB SHADOW E&M-EST. PATIENT-LVL II: CPT | Mod: PBBFAC,,, | Performed by: PSYCHIATRY & NEUROLOGY

## 2018-05-21 PROCEDURE — 99213 OFFICE O/P EST LOW 20 MIN: CPT | Mod: AF,HB,S$PBB, | Performed by: PSYCHIATRY & NEUROLOGY

## 2018-05-21 PROCEDURE — 90833 PSYTX W PT W E/M 30 MIN: CPT | Mod: PBBFAC | Performed by: PSYCHIATRY & NEUROLOGY

## 2018-05-21 PROCEDURE — 99212 OFFICE O/P EST SF 10 MIN: CPT | Mod: PBBFAC | Performed by: PSYCHIATRY & NEUROLOGY

## 2018-05-21 RX ORDER — DIAZEPAM 10 MG/1
10 TABLET ORAL 2 TIMES DAILY
Qty: 60 TABLET | Refills: 2 | Status: SHIPPED | OUTPATIENT
Start: 2018-05-21 | End: 2018-09-18

## 2018-05-21 NOTE — PROGRESS NOTES
Outpatient Psychiatry Follow-Up Visit (MD/NP)  5/21/2018    Clinical Status of Patient:  Outpatient (Ambulatory)    Chief Complaint:  Warren Berg is a 32 y.o. male who presents today for follow-up of Mood Disorder and Anxiety    Met with patient.      Interval History and Content of Current Session:           Depression attenuated somewhat with selegiline, but he complains that it has caused hair-trigger irritability and excessive anger. He further states that when he takes klonopin even at current very low dose, he is excessively somnolent, falling asleep, he says, in the middle of a conversation. He denies current suicidal ideation, and is hopeful of getting a new job in the [employer] at [employer]. Sleep is okay. Eating to excess is unchanged. Denies problems with concentration or memory, but complains of difficulty managing his time.       We discussed options. I cannot find Emsam anywhere on his Medicaid formuary, even the list of meds that requires prior authorization. Trintellix is available with PA from what I can tell, but I cannot get Covermymeds to recognize his demographics. We agree to order a trial of Trintellix and get the pharmacy to help figure out how/where to send a PA request.    Girlfriend drinking has decreased a lot, he volunteers    Review of Systems   Constitutional: Negative for appetite change.   HENT: Negative for congestion and nosebleeds.    Eyes: Negative for visual disturbance.   Respiratory: Negative for chest tightness.    Cardiovascular: Negative for chest pain and palpitations.   Gastrointestinal: Negative for abdominal pain, constipation and diarrhea.   Endocrine: Negative for cold intolerance, polydipsia and polyuria.   Genitourinary: Negative for difficulty urinating.   Musculoskeletal: Negative for arthralgias and gait problem.   Skin: Negative for rash.   Allergic/Immunologic: Negative for immunocompromised state.   Neurological: Negative for tremors, seizures and syncope.    Hematological: Does not bruise/bleed easily.   Psychiatric/Behavioral: Negative for agitation, behavioral problems, confusion, hallucinations and self-injury.     PSYCHOTHERAPY  Site: [facility]  Time: 22 minutes  Participants: Met with patient  Therapeutic Intervention Type: supportive psychotherapy  Why chosen therapy is appropriate versus another modality: patient responds to this modality  Target symptoms: depression, relationship dysfunction  Primary focus: depression  Psychotherapeutic techniques: ventilation, clarification, education  Outcome monitoring methods: self-report, observation, feedback from family  Patient's response to intervention:  The patient's response to intervention is  motivated.  Progress toward goals:  The patient's progress toward goals is good.    Past Medical History:   Diagnosis Date    ADD (attention deficit disorder) 7/27/2012    ADHD (attention deficit hyperactivity disorder)     Bipolar affective disorder, depressed in partial remission     Angela     Migraine     Psychiatric exam      Medications  Current Outpatient Prescriptions on File Prior to Visit   Medication Sig Dispense Refill    fluocinonide 0.05% (LIDEX) 0.05 % cream       hydrochlorothiazide (HYDRODIURIL) 25 MG tablet Take 25 mg by mouth once daily.        hydrocortisone (WESTCORT) 0.2 % cream       lamoTRIgine (LAMICTAL) 200 MG tablet Take 2 tablets (400 mg total) by mouth once daily. 60 tablet 5    losartan (COZAAR) 100 MG tablet   5    PROAIR HFA 90 mcg/actuation inhaler       sildenafil (REVATIO) 20 mg Tab TAKE 1-5 TABLETS DAILY AS NEEDED ERECTILE DYSFUNCTION  2    triamterene (DYRENIUM) 50 MG Cap Take 50 mg by mouth once daily.        [DISCONTINUED] diazePAM (VALIUM) 5 MG tablet Take 1 tablet (5 mg total) by mouth 2 (two) times daily. 60 tablet 2    [DISCONTINUED] vortioxetine (TRINTELLIX) 5 mg Tab Take 5 mg by mouth once daily. 30 tablet 1    rizatriptan (MAXALT-MLT)  10 MG disintegrating tablet   0     No current facility-administered medications on file prior to visit.      Compliance: good  Side effects: somnolence    Risk Parameters:  Patient reports no suicidal ideation  Patient reports no homicidal ideation  Patient reports no self-injurious behavior  Patient reports no violent behavior    EXAM:   vitals were not taken for this visit.    Mental Status Evaluation     Appearance:  Obese, casually dressed, fair grooming, and he is extremely obese.     Behavior:  cooperative, mildly manipulative   Speech:  spontaneous, conversational rate and volume   Mood:  dysthymic   Affect:  mood-congruent, restricted   Thought Process:  goal-directed, logical   Thought Content:  normal, no suicidality, no homicidality, delusions, or paranoia   Sensorium:  person, place, time/date, situation   Attention Span & Concentration able to focus, completed tasks   Cognition:  fund of knowledge intact and appropriate to age and level of education   Insight:  fair   Judgment:  Impaired due to longstanding pattern of maadaptive rationalizing, dissembling,     Diagnosis:  1. Bipolar affective disorder, currently depressed, mild  vortioxetine 10 mg Tab    diazePAM (VALIUM) 10 MG Tab   2. Bipolar disorder, in partial remission, most recent episode depressed     3. Personality disorder  III: Hypertension    Plan:  · Medication Management:  The risks and benefits of medication were discussed with the patient. 1. continue lamotrigine 400 mg daily 2. stop selegiline 5 mg po BID due to irritability and excessive anger 3. stop klonopin due to exc essive sedation 4. trial Trintellix 5 mg daily if I can succeed in getting Medicaid approval 5. diazepam 5 mg BID   · He agrees to see his PCP Dr Fink promptly about his BP and pulse  · Supportive psychotherapy  · Collateral from mother reviewed    Return to Clinic: 3 months, sooner prn

## 2018-07-20 ENCOUNTER — OFFICE VISIT (OUTPATIENT)
Dept: PSYCHIATRY | Facility: CLINIC | Age: 33
End: 2018-07-20
Payer: MEDICAID

## 2018-07-20 VITALS
BODY MASS INDEX: 44.53 KG/M2 | WEIGHT: 284.31 LBS | HEART RATE: 104 BPM | SYSTOLIC BLOOD PRESSURE: 152 MMHG | DIASTOLIC BLOOD PRESSURE: 90 MMHG

## 2018-07-20 DIAGNOSIS — F31.31 BIPOLAR AFFECTIVE DISORDER, CURRENTLY DEPRESSED, MILD: Primary | ICD-10-CM

## 2018-07-20 DIAGNOSIS — F19.21 POLYSUBSTANCE DEPENDENCE, NON-OPIOID, IN REMISSION: ICD-10-CM

## 2018-07-20 PROCEDURE — 90833 PSYTX W PT W E/M 30 MIN: CPT | Mod: PBBFAC | Performed by: PSYCHIATRY & NEUROLOGY

## 2018-07-20 PROCEDURE — 99213 OFFICE O/P EST LOW 20 MIN: CPT | Mod: PBBFAC | Performed by: PSYCHIATRY & NEUROLOGY

## 2018-07-20 PROCEDURE — 90833 PSYTX W PT W E/M 30 MIN: CPT | Mod: AF,HB,S$PBB, | Performed by: PSYCHIATRY & NEUROLOGY

## 2018-07-20 PROCEDURE — 99999 PR PBB SHADOW E&M-EST. PATIENT-LVL III: CPT | Mod: PBBFAC,,, | Performed by: PSYCHIATRY & NEUROLOGY

## 2018-07-20 PROCEDURE — 99214 OFFICE O/P EST MOD 30 MIN: CPT | Mod: AF,HB,S$PBB, | Performed by: PSYCHIATRY & NEUROLOGY

## 2018-07-20 RX ORDER — OXCARBAZEPINE 300 MG/1
300 TABLET, FILM COATED ORAL 2 TIMES DAILY
Qty: 60 TABLET | Refills: 2 | Status: SHIPPED | OUTPATIENT
Start: 2018-07-20 | End: 2018-09-18

## 2018-07-20 RX ORDER — MOMETASONE FUROATE AND FORMOTEROL FUMARATE DIHYDRATE 200; 5 UG/1; UG/1
1 AEROSOL RESPIRATORY (INHALATION) 2 TIMES DAILY
Refills: 0 | COMMUNITY
Start: 2018-06-11

## 2018-07-20 NOTE — PROGRESS NOTES
"Outpatient Psychiatry Follow-Up Visit (MD/NP)  7/20/2018    Clinical Status of Patient:  Outpatient (Ambulatory)    Chief Complaint:  Warren Berg is a 32 y.o. male who presents today for follow-up of Depression and Mood Intensity dysregulation    Met with patient.      Interval History and Content of Current Session:          Had been doing better with Trintellix for over a month, then states that over the past 4-6 weeks he has been increasingly blue, down, irritable, and intermittently enraged. 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 Has had suicidal thoughts, and at one point about 2 weeks ago "found himself" "taking a swig of my vape juice" . This resulted in a day of stomach upset and a resolution not to repeat it, but he is somewhat worried by the fact that this occurred while feeling somewhat dissociated. It has not recurred since, he does not feel suicidal now and does not want to die, and Is not considering any method, has no plan, no available firearms or ammo.     Sleep is decreased. Eating is much decreased, and he has been losing weight due to loss of appetite or any pleasure in food. Pants size down 2 inches in past few weeks. Denies problems with concentration or memory.    He was disappointed to be turned down for job by [employer] because of his record as a drug offender.    Review of Systems   Constitutional: Positive for appetite change.   HENT: Negative for congestion and nosebleeds.    Eyes: Negative for visual disturbance.   Cardiovascular: Negative for chest pain and palpitations.   Gastrointestinal: Negative for abdominal pain, constipation and diarrhea.   Endocrine: Negative for cold intolerance, polydipsia and polyuria. "   Genitourinary: Negative for difficulty urinating.   Musculoskeletal: Negative for arthralgias and gait problem.   Skin: Negative for rash.   Allergic/Immunologic: Negative for immunocompromised state.   Neurological: Negative for tremors, seizures and syncope.   Hematological: Does not bruise/bleed easily.   Psychiatric/Behavioral: Negative for agitation, behavioral problems, confusion, hallucinations and self-injury.     PSYCHOTHERAPY  Site: [facility]  Time: 20 minutes  Participants: Met with patient  Therapeutic Intervention Type: supportive psychotherapy  Why chosen therapy is appropriate versus another modality: patient responds to this modality  Target symptoms: depression, relationship dysfunction  Primary focus: depression  Psychotherapeutic techniques: ventilation, clarification, education  Outcome monitoring methods: self-report, observation, feedback from family  Patient's response to intervention:  The patient's response to intervention is  motivated.  Progress toward goals:  The patient's progress toward goals is good.    Past Medical History:   Diagnosis Date    ADD (attention deficit disorder) 7/27/2012    ADHD (attention deficit hyperactivity disorder)     Bipolar affective disorder, depressed in partial remission     Angela     Migraine     Psychiatric exam      Medications  Current Outpatient Prescriptions on File Prior to Visit   Medication Sig Dispense Refill    diazePAM (VALIUM) 10 MG Tab Take 1 tablet (10 mg total) by mouth 2 (two) times daily. 60 tablet 2    fluocinonide 0.05% (LIDEX) 0.05 % cream       hydrochlorothiazide (HYDRODIURIL) 25 MG tablet Take 25 mg by mouth once daily.        hydrocortisone (WESTCORT) 0.2 % cream       lamoTRIgine (LAMICTAL) 200 MG tablet Take 2 tablets (400 mg total) by mouth once daily. 60 tablet 5    losartan (COZAAR) 100 MG tablet   5    PROAIR HFA 90 mcg/actuation inhaler       rizatriptan (MAXALT-MLT) 10 MG  disintegrating tablet   0    sildenafil (REVATIO) 20 mg Tab TAKE 1-5 TABLETS DAILY AS NEEDED ERECTILE DYSFUNCTION  2    triamterene (DYRENIUM) 50 MG Cap Take 50 mg by mouth once daily.        [DISCONTINUED] vortioxetine 10 mg Tab Take 1 tablet (10 mg total) by mouth once daily. 30 tablet 2     No current facility-administered medications on file prior to visit.      Compliance: good  Side effects: see above    Risk Parameters:  Patient reports no suicidal ideation  Patient reports no homicidal ideation  Patient reports no self-injurious behavior  Patient reports no violent behavior    EXAM:   weight is 128.9 kg (284 lb 4.5 oz). His blood pressure is 152/90 (abnormal) and his pulse is 104.    24 lb unintentional weight loss since last visit  Mental Status Evaluation     Appearance:  Obese, casually dressed, fair grooming, pants loose fitting   Behavior:  cooperative, arrives 40 minutes late   Speech:  spontaneous, conversational rate and volume   Mood:  dysthymic   Affect:  mood-congruent, restricted   Thought Process:  goal-directed, logical   Thought Content:  normal, no suicidality, no homicidality, delusions, or paranoia   Sensorium:  person, place, time/date, situation   Attention Span & Concentration able to focus, completed tasks   Cognition:  fund of knowledge intact and appropriate to age and level of education   Insight:  fair   Judgment:  Impaired due to longstanding pattern of maadaptive rationalizing, dissembling,     Diagnosis:  1. Bipolar affective disorder, currently depressed, mild  vortioxetine 20 mg Tab    OXcarbazepine (TRILEPTAL) 300 MG Tab   2. Polysubstance dependence, non-opioid, in remission     3. Personality disorder  III: Hypertension    Plan:  · Medication Management:  The risks and benefits of medication were discussed with the patient. 1. increase Trintellix to 20 mg daily 2.add Trileptal 300 mg po BID. This has been helpful to him in the past for angry mixed manic/dysphoric mood  statesl 3.no change in lamotrigine/diazepam   · AA/NA  · Supportive psychotherapy  · Collateral from mother reviewed    Return to Clinic: 3 months, sooner prn

## 2018-09-18 ENCOUNTER — OFFICE VISIT (OUTPATIENT)
Dept: PSYCHIATRY | Facility: CLINIC | Age: 33
End: 2018-09-18

## 2018-09-18 DIAGNOSIS — F31.31 BIPOLAR AFFECTIVE DISORDER, CURRENTLY DEPRESSED, MILD: Primary | ICD-10-CM

## 2018-09-18 DIAGNOSIS — F19.21 POLYSUBSTANCE DEPENDENCE, NON-OPIOID, IN REMISSION: ICD-10-CM

## 2018-09-18 PROCEDURE — 99213 OFFICE O/P EST LOW 20 MIN: CPT | Mod: S$PBB,,, | Performed by: PSYCHIATRY & NEUROLOGY

## 2018-09-18 PROCEDURE — 99212 OFFICE O/P EST SF 10 MIN: CPT | Mod: PBBFAC | Performed by: PSYCHIATRY & NEUROLOGY

## 2018-09-18 PROCEDURE — 90833 PSYTX W PT W E/M 30 MIN: CPT | Mod: PBBFAC | Performed by: PSYCHIATRY & NEUROLOGY

## 2018-09-18 PROCEDURE — 90833 PSYTX W PT W E/M 30 MIN: CPT | Mod: S$PBB,,, | Performed by: PSYCHIATRY & NEUROLOGY

## 2018-09-18 PROCEDURE — 99999 PR PBB SHADOW E&M-EST. PATIENT-LVL II: CPT | Mod: PBBFAC,,, | Performed by: PSYCHIATRY & NEUROLOGY

## 2018-09-18 RX ORDER — LAMOTRIGINE 200 MG/1
400 TABLET ORAL DAILY
Qty: 60 TABLET | Refills: 5 | Status: SHIPPED | OUTPATIENT
Start: 2018-09-18 | End: 2019-02-11 | Stop reason: SDUPTHER

## 2018-09-18 RX ORDER — DIAZEPAM 10 MG/1
10 TABLET ORAL 2 TIMES DAILY
Qty: 60 TABLET | Refills: 3 | Status: SHIPPED | OUTPATIENT
Start: 2018-09-18 | End: 2019-02-11 | Stop reason: SDUPTHER

## 2018-09-18 RX ORDER — OXCARBAZEPINE 600 MG/1
600 TABLET, FILM COATED ORAL 2 TIMES DAILY
Qty: 60 TABLET | Refills: 3 | Status: SHIPPED | OUTPATIENT
Start: 2018-09-18 | End: 2019-02-11 | Stop reason: SDUPTHER

## 2018-09-18 NOTE — PROGRESS NOTES
"Outpatient Psychiatry Follow-Up Visit (MD/NP)  9/18/2018    Clinical Status of Patient:  Outpatient (Ambulatory)    Chief Complaint:  Warren Berg is a 32 y.o. male who presents today for follow-up of Mood Disorder    Met with patient.      Interval History and Content of Current Session:          Had been doing better with Trintellix for over a month, then states that over the past 4-6 weeks he has been increasingly blue, down, irritable, and intermittently enraged. 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 Has had suicidal thoughts, and at one point about 2 weeks ago "found himself" "taking a swig of my vape juice" . This resulted in a day of stomach upset and a resolution not to repeat it, but he is somewhat worried by the fact that this occurred while feeling somewhat dissociated. It has not recurred since, he does not feel suicidal now and does not want to die, and Is not considering any method, has no plan, no available firearms or ammo.     Sleep is decreased. Eating is much decreased, and he has been losing weight due to loss of appetite or any pleasure in food. Pants size down 2 inches in past few weeks. Denies problems with concentration or memory.      Review of Systems   Constitutional: Negative.    Eyes: Negative for visual disturbance.   Respiratory: Negative for shortness of breath.    Cardiovascular: Negative for chest pain and palpitations.   Gastrointestinal: Negative for constipation, diarrhea and vomiting.   Endocrine: Negative for cold intolerance and heat intolerance.   Genitourinary: Negative for difficulty urinating, frequency and hematuria.   Musculoskeletal: Negative for gait problem and neck pain.   Allergic/Immunologic: Negative " for food allergies.   Neurological: Negative for seizures and headaches.   Hematological: Negative for adenopathy.   Psychiatric/Behavioral: Positive for behavioral problems and sleep disturbance. Negative for confusion, decreased concentration, hallucinations, self-injury and suicidal ideas. The patient is hyperactive.      PSYCHOTHERAPY  Site: [facility], [address]  Time: 20 minutes  Participants: Met with patient  Therapeutic Intervention Type: supportive psychotherapy  Why chosen therapy is appropriate versus another modality: patient responds to this modality  Target symptoms: depression, relationship dysfunction  Primary focus: depression  Psychotherapeutic techniques: ventilation, clarification, education  Outcome monitoring methods: self-report, observation, feedback from family  Patient's response to intervention:  The patient's response to intervention is  motivated.  Progress toward goals:  The patient's progress toward goals is good.    Past Medical History:   Diagnosis Date    ADD (attention deficit disorder) 7/27/2012    ADHD (attention deficit hyperactivity disorder)     Bipolar affective disorder, depressed in partial remission     Angela     Migraine     Psychiatric exam      Medications  Current Outpatient Medications on File Prior to Visit   Medication Sig Dispense Refill    diazePAM (VALIUM) 10 MG Tab Take 1 tablet (10 mg total) by mouth 2 (two) times daily. 60 tablet 2    DULERA 200-5 mcg/actuation inhaler Inhale 1 puff into the lungs 2 (two) times daily.  0    fluocinonide 0.05% (LIDEX) 0.05 % cream       hydrochlorothiazide (HYDRODIURIL) 25 MG tablet Take 25 mg by mouth once daily.        hydrocortisone (WESTCORT) 0.2 % cream       lamoTRIgine (LAMICTAL) 200 MG tablet Take 2 tablets (400 mg total) by mouth once daily. 60 tablet 5    losartan (COZAAR) 100 MG tablet   5    OXcarbazepine (TRILEPTAL) 300 MG Tab Take 1 tablet (300 mg total) by mouth 2 (two) times  daily. 60 tablet 2    PROAIR HFA 90 mcg/actuation inhaler       rizatriptan (MAXALT-MLT) 10 MG disintegrating tablet   0    sildenafil (REVATIO) 20 mg Tab TAKE 1-5 TABLETS DAILY AS NEEDED ERECTILE DYSFUNCTION  2    triamterene (DYRENIUM) 50 MG Cap Take 50 mg by mouth once daily.        vortioxetine 20 mg Tab Take 1 tablet (20 mg total) by mouth once daily. 30 tablet 3     No current facility-administered medications on file prior to visit.      Compliance: good  Side effects: see above    Risk Parameters:  Patient reports no suicidal ideation  Patient reports no homicidal ideation  Patient reports no self-injurious behavior  Patient reports no violent behavior    EXAM:   vitals were not taken for this visit.    Mental Status Evaluation     Appearance:  Obese, casually dressed, fair grooming, pants loose fitting   Behavior:  cooperative, arrives 40 minutes late   Speech:  spontaneous, conversational rate and volume   Mood:  dysthymic   Affect:  mood-congruent, restricted   Thought Process:  goal-directed, logical   Thought Content:  normal, no suicidality, no homicidality, delusions, or paranoia   Sensorium:  person, place, time/date, situation   Attention Span & Concentration able to focus, completed tasks   Cognition:  fund of knowledge intact and appropriate to age and level of education   Insight:  fair   Judgment:  Impaired due to longstanding pattern of maadaptive rationalizing, dissembling,     Diagnosis:  1. Bipolar affective disorder, currently depressed, mild     2. Polysubstance dependence, non-opioid, in remission     3.      Personality disorder  III:    Hypertension    Plan:  · Medication Management:  The risks and benefits of medication were discussed with the patient. 1. decrease Trintellix to 10 mg daily 2.increase Trileptal to 600 mg po BID. This has been helpful to him in the past for angry mixed manic/dysphoric mood statesl 3.no change in lamotrigine/diazepam   · AA/NA  · Supportive  psychotherapy  · Collateral from mother reviewed    Return to Clinic: 3 months, sooner prn

## 2019-02-11 ENCOUNTER — OFFICE VISIT (OUTPATIENT)
Dept: PSYCHIATRY | Facility: CLINIC | Age: 34
End: 2019-02-11
Payer: MEDICAID

## 2019-02-11 VITALS
DIASTOLIC BLOOD PRESSURE: 96 MMHG | SYSTOLIC BLOOD PRESSURE: 175 MMHG | HEART RATE: 116 BPM | WEIGHT: 291.69 LBS | BODY MASS INDEX: 45.68 KG/M2

## 2019-02-11 DIAGNOSIS — F19.21 POLYSUBSTANCE DEPENDENCE, NON-OPIOID, IN REMISSION: ICD-10-CM

## 2019-02-11 DIAGNOSIS — F31.31 BIPOLAR AFFECTIVE DISORDER, CURRENTLY DEPRESSED, MILD: Primary | ICD-10-CM

## 2019-02-11 PROCEDURE — 99999 PR PBB SHADOW E&M-EST. PATIENT-LVL III: ICD-10-PCS | Mod: PBBFAC,,, | Performed by: PSYCHIATRY & NEUROLOGY

## 2019-02-11 PROCEDURE — 99214 PR OFFICE/OUTPT VISIT, EST, LEVL IV, 30-39 MIN: ICD-10-PCS | Mod: AF,HB,S$PBB, | Performed by: PSYCHIATRY & NEUROLOGY

## 2019-02-11 PROCEDURE — 90833 PSYTX W PT W E/M 30 MIN: CPT | Mod: PBBFAC | Performed by: PSYCHIATRY & NEUROLOGY

## 2019-02-11 PROCEDURE — 99999 PR PBB SHADOW E&M-EST. PATIENT-LVL III: CPT | Mod: PBBFAC,,, | Performed by: PSYCHIATRY & NEUROLOGY

## 2019-02-11 PROCEDURE — 90833 PR PSYCHOTHERAPY W/PATIENT W/E&M, 30 MIN (ADD ON): ICD-10-PCS | Mod: AF,HB,S$PBB, | Performed by: PSYCHIATRY & NEUROLOGY

## 2019-02-11 PROCEDURE — 90833 PSYTX W PT W E/M 30 MIN: CPT | Mod: AF,HB,S$PBB, | Performed by: PSYCHIATRY & NEUROLOGY

## 2019-02-11 PROCEDURE — 99214 OFFICE O/P EST MOD 30 MIN: CPT | Mod: AF,HB,S$PBB, | Performed by: PSYCHIATRY & NEUROLOGY

## 2019-02-11 PROCEDURE — 99213 OFFICE O/P EST LOW 20 MIN: CPT | Mod: PBBFAC | Performed by: PSYCHIATRY & NEUROLOGY

## 2019-02-11 RX ORDER — DIAZEPAM 10 MG/1
10 TABLET ORAL 2 TIMES DAILY
Qty: 60 TABLET | Refills: 5 | Status: SHIPPED | OUTPATIENT
Start: 2019-02-11 | End: 2019-09-04 | Stop reason: ALTCHOICE

## 2019-02-11 RX ORDER — OXCARBAZEPINE 600 MG/1
600 TABLET, FILM COATED ORAL 2 TIMES DAILY
Qty: 60 TABLET | Refills: 5 | Status: SHIPPED | OUTPATIENT
Start: 2019-02-11 | End: 2019-09-04 | Stop reason: SDUPTHER

## 2019-02-11 RX ORDER — LAMOTRIGINE 200 MG/1
400 TABLET ORAL DAILY
Qty: 60 TABLET | Refills: 5 | Status: SHIPPED | OUTPATIENT
Start: 2019-02-11 | End: 2019-09-04 | Stop reason: SDUPTHER

## 2019-02-11 NOTE — PROGRESS NOTES
Outpatient Psychiatry Follow-Up Visit (MD/NP)  2/11/2019    Clinical Status of Patient:  Outpatient (Ambulatory)    Chief Complaint:  Warren Berg is a 33 y.o. male who presents today for follow-up of Mood Disorder    Met with patient.  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     Sleep is regular recently. Eating is stable Denies problems with concentration or memory.    Review of Systems   Constitutional: Negative.    Eyes: Negative for visual disturbance.   Respiratory: Negative for shortness of breath.    Cardiovascular: Negative for chest pain and palpitations.   Gastrointestinal: Negative for constipation, diarrhea and vomiting.   Endocrine: Negative for cold intolerance and heat intolerance.   Genitourinary: Negative for difficulty urinating, frequency and hematuria.   Musculoskeletal: Negative for gait problem and neck pain.   Allergic/Immunologic: Negative for food allergies.   Neurological: Negative for seizures and headaches.   Hematological: Negative for adenopathy.   Psychiatric/Behavioral: Negative for agitation, confusion, hallucinations and suicidal ideas.     PSYCHOTHERAPY  Site: [facility], [address]  Time: 21 minutes  Participants: Met with patient  Therapeutic Intervention Type: supportive psychotherapy  Why chosen therapy is appropriate versus another modality: patient responds to this modality  Target symptoms: mood disorder, relationship dysfunction  Primary focus: depression  Psychotherapeutic techniques: ventilation, clarification, education  Outcome monitoring methods: self-report, observation, feedback from family  Patient's response to intervention:  The patient's response to intervention is  motivated.  Progress toward goals:  The  patient's progress toward goals is good.    Past Medical History:   Diagnosis Date    ADD (attention deficit disorder) 7/27/2012    ADHD (attention deficit hyperactivity disorder)     Bipolar affective disorder, depressed in partial remission     Angela     Migraine     Psychiatric exam      Medications  Current Outpatient Medications on File Prior to Visit   Medication Sig Dispense Refill    diazePAM (VALIUM) 10 MG Tab Take 1 tablet (10 mg total) by mouth 2 (two) times daily. 60 tablet 3    DULERA 200-5 mcg/actuation inhaler Inhale 1 puff into the lungs 2 (two) times daily.  0    fluocinonide 0.05% (LIDEX) 0.05 % cream       hydrochlorothiazide (HYDRODIURIL) 25 MG tablet Take 25 mg by mouth once daily.        hydrocortisone (WESTCORT) 0.2 % cream       lamoTRIgine (LAMICTAL) 200 MG tablet Take 2 tablets (400 mg total) by mouth once daily. 60 tablet 5    losartan (COZAAR) 100 MG tablet   5    OXcarbazepine (TRILEPTAL) 600 MG Tab Take 1 tablet (600 mg total) by mouth 2 (two) times daily. 60 tablet 3    PROAIR HFA 90 mcg/actuation inhaler       rizatriptan (MAXALT-MLT) 10 MG disintegrating tablet   0    sildenafil (REVATIO) 20 mg Tab TAKE 1-5 TABLETS DAILY AS NEEDED ERECTILE DYSFUNCTION  2    triamterene (DYRENIUM) 50 MG Cap Take 50 mg by mouth once daily.        vortioxetine (TRINTELLIX) 10 mg Tab Take 1 tablet (10 mg total) by mouth once daily. 30 tablet 3     No current facility-administered medications on file prior to visit.      Compliance: good  Side effects: see above    Risk Parameters:  Patient reports no suicidal ideation  Patient reports no homicidal ideation  Patient reports no self-injurious behavior  Patient reports no violent behavior    EXAM:   weight is 132.3 kg (291 lb 10.7 oz). His blood pressure is 175/96 (abnormal) and his pulse is 116 (abnormal).    Mental Status Evaluation     Appearance:  Obese, casually dressed, fair grooming   Behavior:  cooperative, on time   Speech:   spontaneous, conversational rate and volume   Mood:  euthymic   Affect:  appropriate, mood-congruent   Thought Process:  goal-directed, logical   Thought Content:  normal, no suicidality, no homicidality, delusions, or paranoia   Sensorium:  person, place, time/date, situation   Attention Span & Concentration able to focus, completed tasks   Cognition:  fund of knowledge intact and appropriate to age and level of education   Insight:  fair   Judgment:  good     Diagnosis:  1. Bipolar affective disorder, currently depressed, mild  diazePAM (VALIUM) 10 MG Tab    lamoTRIgine (LAMICTAL) 200 MG tablet    OXcarbazepine (TRILEPTAL) 600 MG Tab    vortioxetine (TRINTELLIX) 10 mg Tab   2. Polysubstance dependence, non-opioid, in remission     3.      Personality disorder  III:    Hypertension    Plan:  · Medication Management:  Continue current medications. 1. continue [id number]  mg daily 2.continue Trileptal 600 mg po BID. 3.no change in lamotrigine/diazepam   · AA/NA  · Supportive psychotherapy  · Collateral from girlfriend reviewed    Return to Clinic: 3 months, sooner prn

## 2019-09-04 ENCOUNTER — OFFICE VISIT (OUTPATIENT)
Dept: PSYCHIATRY | Facility: CLINIC | Age: 34
End: 2019-09-04
Payer: MEDICAID

## 2019-09-04 VITALS
BODY MASS INDEX: 45.71 KG/M2 | WEIGHT: 291.25 LBS | HEART RATE: 121 BPM | SYSTOLIC BLOOD PRESSURE: 179 MMHG | DIASTOLIC BLOOD PRESSURE: 97 MMHG | HEIGHT: 67 IN

## 2019-09-04 DIAGNOSIS — F31.32 BIPOLAR AFFECTIVE DISORDER, CURRENTLY DEPRESSED, MODERATE: ICD-10-CM

## 2019-09-04 DIAGNOSIS — F19.21 POLYSUBSTANCE DEPENDENCE IN EARLY, EARLY PARTIAL, SUSTAINED FULL, OR SUSTAINED PARTIAL REMISSION: Primary | ICD-10-CM

## 2019-09-04 PROCEDURE — 90833 PR PSYCHOTHERAPY W/PATIENT W/E&M, 30 MIN (ADD ON): ICD-10-PCS | Mod: AF,HB,, | Performed by: PSYCHIATRY & NEUROLOGY

## 2019-09-04 PROCEDURE — 99999 PR PBB SHADOW E&M-EST. PATIENT-LVL III: CPT | Mod: PBBFAC,,, | Performed by: PSYCHIATRY & NEUROLOGY

## 2019-09-04 PROCEDURE — 99213 PR OFFICE/OUTPT VISIT, EST, LEVL III, 20-29 MIN: ICD-10-PCS | Mod: S$PBB,AF,HB, | Performed by: PSYCHIATRY & NEUROLOGY

## 2019-09-04 PROCEDURE — 99213 OFFICE O/P EST LOW 20 MIN: CPT | Mod: PBBFAC | Performed by: PSYCHIATRY & NEUROLOGY

## 2019-09-04 PROCEDURE — 99999 PR PBB SHADOW E&M-EST. PATIENT-LVL III: ICD-10-PCS | Mod: PBBFAC,,, | Performed by: PSYCHIATRY & NEUROLOGY

## 2019-09-04 PROCEDURE — 90833 PSYTX W PT W E/M 30 MIN: CPT | Mod: AF,HB,, | Performed by: PSYCHIATRY & NEUROLOGY

## 2019-09-04 PROCEDURE — 99213 OFFICE O/P EST LOW 20 MIN: CPT | Mod: S$PBB,AF,HB, | Performed by: PSYCHIATRY & NEUROLOGY

## 2019-09-04 RX ORDER — LAMOTRIGINE 200 MG/1
400 TABLET ORAL DAILY
Qty: 60 TABLET | Refills: 5 | Status: SHIPPED | OUTPATIENT
Start: 2019-09-04 | End: 2020-10-13

## 2019-09-04 RX ORDER — DIAZEPAM 10 MG/1
10 TABLET ORAL 2 TIMES DAILY
Qty: 60 TABLET | Refills: 5 | Status: SHIPPED | OUTPATIENT
Start: 2019-09-04 | End: 2019-12-06 | Stop reason: SDUPTHER

## 2019-09-04 RX ORDER — OXCARBAZEPINE 600 MG/1
600 TABLET, FILM COATED ORAL 2 TIMES DAILY
Qty: 60 TABLET | Refills: 5 | Status: SHIPPED | OUTPATIENT
Start: 2019-09-04 | End: 2020-09-29 | Stop reason: DRUGHIGH

## 2019-09-04 RX ORDER — LOSARTAN POTASSIUM AND HYDROCHLOROTHIAZIDE 12.5; 1 MG/1; MG/1
1 TABLET ORAL
COMMUNITY

## 2019-09-04 NOTE — ASSESSMENT & PLAN NOTE
"Most recently addictively used heroin from Jan 2019 through August 6, his last heroin use. Previous severe methamphetamine addiction. Still using "marijuana vape pen" regularly as of 9/4/2019. Incarcerated more than once for methamphetamine related convictions. July 2019 arrested and jailed overnight for "simple battery of a dating partner," referring to his "girlfriend", who was his heroin injecting partner  "

## 2019-09-04 NOTE — PROGRESS NOTES
Outpatient Psychiatry Follow-Up Visit (MD/NP)  9/4/2019    Clinical Status of Patient:  Outpatient (Ambulatory)    Chief Complaint:  Warren Berg is a 33 y.o. male who presents today for follow-up of Mood Disorder; Addiction Problem (heroin most recently, last use 6 months ago); and poor relationship functioning    Met with patient.    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       PSYCHOTHERAPY  Site: [facility], [address]  Time: 20 minutes  Participants: Met with patient  Therapeutic Intervention Type: supportive psychotherapy, motivational interviewing  Why chosen therapy is appropriate versus another modality: patient responds to this modality  Target symptoms: addiction recovery, remorse, depression risk  Primary focus: depression  Psychotherapeutic techniques: ventilation, clarification, education  Outcome monitoring methods: self-report, observation, feedback from family  Patient's response to intervention:  The patient's response to intervention is  motivated.  Progress toward goals:  The patient's progress toward goals is good.    Review of Systems   Constitutional: no fever, no weight loss    Eyes: Negative for visual disturbance.   Respiratory: Negative for shortness of breath.    Cardiovascular: Negative for chest pain and palpitations.   Gastrointestinal: Negative for constipation, diarrhea and vomiting.   Endocrine: Negative for cold intolerance and heat intolerance.   Genitourinary: Negative for difficulty urinating, frequency and hematuria.   Musculoskeletal: Negative for gait problem and neck pain.   Allergic/Immunologic: Negative for food allergies.   Neurological: Negative for seizures and headaches.   Hematological: Negative for adenopathy.   Psychiatric/Behavioral: Negative for  "agitation, confusion, hallucinations and suicidal ideas.       Past Medical History:   Diagnosis Date    ADD (attention deficit disorder) 7/27/2012    ADHD (attention deficit hyperactivity disorder)     Bipolar affective disorder, depressed in partial remission     Angela     Migraine     Psychiatric exam      Medications  Current Outpatient Medications on File Prior to Visit   Medication Sig Dispense Refill    DULERA 200-5 mcg/actuation inhaler Inhale 1 puff into the lungs 2 (two) times daily.  0    losartan-hydrochlorothiazide 100-12.5 mg (HYZAAR) 100-12.5 mg Tab Take 1 tablet by mouth.      diazePAM (VALIUM) 10 MG Tab Take 1 tablet (10 mg total) by mouth 2 (two) times daily. 60 tablet 5     lamoTRIgine (LAMICTAL) 200 MG tablet Take 2 tablets (400 mg total) by mouth once daily. 60 tablet 5     OXcarbazepine (TRILEPTAL) 600 MG Tab Take 1 tablet (600 mg total) by mouth 2 (two) times daily. 60 tablet 5     vortioxetine (TRINTELLIX) 10 mg Tab Take 1 tablet (10 mg total) by mouth once daily. 30 tablet 5     No current facility-administered medications on file prior to visit.      Compliance: about 60% of doses recently  Side effects: none that he thinks are from his prescribed meds. Many adverse effects from cannabis and opioid use, including continuing constipation after 4 weeks of no opioids    Risk Parameters:  Patient reports no suicidal ideation  Patient reports no homicidal ideation  Patient reports no self-injurious behavior  Patient reports no violent behavior    EXAM:   height is 5' 7" (1.702 m) and weight is 132.1 kg (291 lb 3.6 oz). His blood pressure is 179/97 (abnormal) and his pulse is 121 (abnormal).    Mental Status Evaluation     Appearance:  Obese, casually dressed, poor grooming   Behavior:  cooperative, 10 min late.   Speech:  spontaneous, conversational rate and volume   Mood:  euthymic   Affect:  appropriate, mood-congruent   Thought Process:  goal-directed, logical   Thought Content:  " normal, no suicidality, no homicidality, delusions, or paranoia   Sensorium:  person, place, time/date, situation   Attention Span & Concentration able to focus, completed tasks   Cognition:  fund of knowledge intact and appropriate to age and level of education   Insight:  fair   Judgment:  good     Diagnosis:  1. Bipolar affective disorder, currently depressed, mild  vortioxetine (TRINTELLIX) 20 mg Tab    OXcarbazepine (TRILEPTAL) 600 MG Tab    lamoTRIgine (LAMICTAL) 200 MG tablet    diazePAM (VALIUM) 10 MG Tab   2. Polysubstance dependence in early, early partial, sustained full, or sustained partial remission     3.      Personality disorder  III:    Hypertension    Plan:  · Medication Management:  1. increase Trintellix to 20 mg daily 2.continue Trileptal 600 mg po BID. 3.no change in lamotrigine/diazepam   · He refuses any medication aided addiction relapse prevention intervention   · He refuses any 12 step program or ACoA  · Supportive psychotherapy    Return to Clinic: 3 months, sooner prn

## 2019-12-06 ENCOUNTER — OFFICE VISIT (OUTPATIENT)
Dept: PSYCHIATRY | Facility: CLINIC | Age: 34
End: 2019-12-06
Payer: MEDICAID

## 2019-12-06 VITALS
WEIGHT: 280.31 LBS | DIASTOLIC BLOOD PRESSURE: 98 MMHG | HEART RATE: 122 BPM | SYSTOLIC BLOOD PRESSURE: 173 MMHG | BODY MASS INDEX: 43.9 KG/M2

## 2019-12-06 DIAGNOSIS — F19.21 POLYSUBSTANCE DEPENDENCE IN EARLY, EARLY PARTIAL, SUSTAINED FULL, OR SUSTAINED PARTIAL REMISSION: ICD-10-CM

## 2019-12-06 DIAGNOSIS — E66.01 CLASS 3 SEVERE OBESITY WITHOUT SERIOUS COMORBIDITY WITH BODY MASS INDEX (BMI) OF 40.0 TO 44.9 IN ADULT, UNSPECIFIED OBESITY TYPE: Chronic | ICD-10-CM

## 2019-12-06 DIAGNOSIS — F31.32 BIPOLAR AFFECTIVE DISORDER, CURRENTLY DEPRESSED, MODERATE: ICD-10-CM

## 2019-12-06 DIAGNOSIS — I10 ESSENTIAL HYPERTENSION: ICD-10-CM

## 2019-12-06 DIAGNOSIS — F31.75 BIPOLAR AFFECTIVE DISORDER, DEPRESSED IN PARTIAL REMISSION: Primary | ICD-10-CM

## 2019-12-06 PROCEDURE — 99213 OFFICE O/P EST LOW 20 MIN: CPT | Mod: PBBFAC | Performed by: PSYCHIATRY & NEUROLOGY

## 2019-12-06 PROCEDURE — 99214 OFFICE O/P EST MOD 30 MIN: CPT | Mod: AF,HB,S$PBB, | Performed by: PSYCHIATRY & NEUROLOGY

## 2019-12-06 PROCEDURE — 90833 PSYTX W PT W E/M 30 MIN: CPT | Mod: AF,HB,S$PBB, | Performed by: PSYCHIATRY & NEUROLOGY

## 2019-12-06 PROCEDURE — 99999 PR PBB SHADOW E&M-EST. PATIENT-LVL III: CPT | Mod: PBBFAC,,, | Performed by: PSYCHIATRY & NEUROLOGY

## 2019-12-06 PROCEDURE — 90833 PR PSYCHOTHERAPY W/PATIENT W/E&M, 30 MIN (ADD ON): ICD-10-PCS | Mod: AF,HB,S$PBB, | Performed by: PSYCHIATRY & NEUROLOGY

## 2019-12-06 PROCEDURE — 99999 PR PBB SHADOW E&M-EST. PATIENT-LVL III: ICD-10-PCS | Mod: PBBFAC,,, | Performed by: PSYCHIATRY & NEUROLOGY

## 2019-12-06 PROCEDURE — 99214 PR OFFICE/OUTPT VISIT, EST, LEVL IV, 30-39 MIN: ICD-10-PCS | Mod: AF,HB,S$PBB, | Performed by: PSYCHIATRY & NEUROLOGY

## 2019-12-06 RX ORDER — DIAZEPAM 10 MG/1
10 TABLET ORAL 2 TIMES DAILY
Qty: 60 TABLET | Refills: 5 | Status: SHIPPED | OUTPATIENT
Start: 2019-12-06 | End: 2020-10-13 | Stop reason: SDUPTHER

## 2019-12-06 NOTE — PROGRESS NOTES
Outpatient Psychiatry Follow-Up Visit (MD/NP)  12/6/2019    Clinical Status of Patient:  Outpatient (Ambulatory)    Chief Complaint:  Warren Berg is a 34 y.o. male who presents today for follow-up of Addiction Problem and Mood Disorder    Met with patient.    Interval History and Content of Current Session:          Doing better since last visit: has maintained relationship cohabiting with girlfriend who is a recovering alcoholic      PSYCHOTHERAPY  Site: [facility], [address]  Time: 21 minutes  Participants: Met with patient  Therapeutic Intervention Type: supportive psychotherapy, motivational interviewing  Why chosen therapy is appropriate versus another modality: patient responds to this modality  Target symptoms: addiction recovery, remorse, depression risk  Primary focus: addiction relapse prevention/risk reduction  Psychotherapeutic techniques: ventilation, clarification, education  Outcome monitoring methods: self-report, observation, feedback from family  Patient's response to intervention:  The patient's response to intervention is  motivated.  Progress toward goals:  The patient's progress toward goals is good.    Review of Systems   History obtained from patient.  General ROS: negative for - fatigue and weight loss. He has lost 11 lb intentionally since last visit. Morbidly obese  Psychological ROS: positive for - behavioral disorder  negative for - concentration difficulties, depression, hallucinations, memory difficulties, physical abuse, sexual abuse or suicidal ideation  Ophthalmic ROS: negative for - decreased vision or dry eyes  ENT ROS: negative for - epistaxis, nasal congestion or oral lesions  Hematological and Lymphatic ROS: negative for - bruising, jaundice or pallor  Endocrine ROS: negative for - change in hair pattern, galactorrhea, skin changes or temperature intolerance  Respiratory ROS: no cough, shortness of breath, or wheezing. Using albuterol inhaler  prn  Cardiovascular ROS: no chest pain or palpitations. Discussed his HTN and poor compliance with antihypertensive  Gastrointestinal ROS: no abdominal pain, change in bowel habits  Urinary ROS: no dysuria, trouble voiding or hematuria  Musculoskeletal ROS: negative for - joint pain, muscle pain or excess muscle tone  Neurological ROS: negative for - headaches, seizures, tremors, tics, or other AIMs  Dermatological ROS: negative for pruritus. Positive for facial rosacea  .   Past Medical History:   Diagnosis Date    ADD (attention deficit disorder) 7/27/2012    ADHD (attention deficit hyperactivity disorder)     Bipolar affective disorder, depressed in partial remission     Class 3 severe obesity without serious comorbidity with body mass index (BMI) of 40.0 to 44.9 in adult 12/6/2019    Essential hypertension 12/6/2019    Angela     Migraine     Psychiatric exam      Medications  Current Outpatient Medications on File Prior to Visit   Medication Sig Dispense Refill    DULERA 200-5 mcg/actuation inhaler Inhale 1 puff into the lungs 2 (two) times daily.  0    losartan-hydrochlorothiazide 100-12.5 mg (HYZAAR) 100-12.5 mg Tab Take 1 tablet by mouth.      diazePAM (VALIUM) 10 MG Tab Take 1 tablet (10 mg total) by mouth 2 (two) times daily. 60 tablet 5     lamoTRIgine (LAMICTAL) 200 MG tablet Take 2 tablets (400 mg total) by mouth once daily. 60 tablet 5     OXcarbazepine (TRILEPTAL) 600 MG Tab Take 1 tablet (600 mg total) by mouth 2 (two) times daily. 60 tablet 5     vortioxetine (TRINTELLIX) 10 mg Tab Take 1 tablet (10 mg total) by mouth once daily. 30 tablet 5     No current facility-administered medications on file prior to visit.      Compliance: good for his psychotropics except for diazepam which he has been out of; poorly compliant with losartan  Side effects: none that he thinks are from his prescribed meds.    Risk Parameters:  Patient reports no suicidal ideation  Patient reports no homicidal  ideation  Patient reports no self-injurious behavior  Patient reports no violent behavior    EXAM:   weight is 127.2 kg (280 lb 5 oz). His blood pressure is 173/98 (abnormal) and his pulse is 122 (abnormal).    Mental Status Evaluation     Appearance:  Obese, casually dressed, poor grooming   Behavior:  cooperative, 10 min late, accompanied by GF   Speech:  spontaneous, conversational rate and volume   Mood:  anxious   Affect:  mood-congruent, restricted, decreased range   Thought Process:  circumstantial   Thought Content:  normal, no suicidality, no homicidality, delusions, or paranoia   Sensorium:  person, place, time/date, situation   Attention Span & Concentration able to focus, completed tasks   Cognition:  fund of knowledge intact and appropriate to age and level of education   Insight:  fair   Judgment:  fair except for managing relationships     Diagnosis:  1. Bipolar affective disorder, currently depressed, moderate     2. Polysubstance dependence in early, early partial, sustained full, or sustained partial remission     3.      Personality disorder  III:    Hypertension    Plan:  · Medication Management:  1. increase Trintellix to 20 mg daily 2.continue Trileptal 600 mg po BID. 3.no change in lamotrigine/diazepam   · He refuses any medication aided addiction relapse prevention intervention   · He refuses any 12 step program or ACoA  · Supportive psychotherapy    Return to Clinic: 3 months, sooner prn

## 2020-09-25 ENCOUNTER — PATIENT MESSAGE (OUTPATIENT)
Dept: PSYCHIATRY | Facility: CLINIC | Age: 35
End: 2020-09-25

## 2020-09-25 ENCOUNTER — TELEPHONE (OUTPATIENT)
Dept: PSYCHIATRY | Facility: CLINIC | Age: 35
End: 2020-09-25

## 2020-09-25 NOTE — TELEPHONE ENCOUNTER
Spoke to patient's mom Doris on the phone. Advised mom to bring the patient to the emergency room for further stabilization and safety. Mom reports that she doesn't want to bring the patient to the emergency room. Mom reported that he went to the emergency room before and he got arrested. Mom reports that she doesn't want him to go to penitentiary. Mom reports that they live in [place]. Mom currently states that the patient finally ate, drank some water, and is trying to go to sleep. Mom reports that if the patient gets bad enough, she will take him to the emergency room in [place] instead. Also advised mom to call 911 if she cannot bring him to the emergency room. Mom verbalized understanding. Mom reports that she will bring the patient to see his primary care provider Dr. Fink on Monday 9/28/2020. Scheduled an appointment for the patient in the psychiatry clinic on Tuesday 9/29/2020. Mom understands that it will be a virtual visit on 9/29/2020. Mom later called the clinic back and said that the patient was not as bad as she thought he was. Confirmed psychiatry appointment with mom again on 9/29/2020. No further needs from mom.

## 2020-09-25 NOTE — TELEPHONE ENCOUNTER
Patient's mom Doris called the clinic. She reported to staff member Kim Balderas that the patient is getting violent, out of control, and has rapid cycling. Mom reports that she is afraid that the patient is going to kill someone or someone is going to kill him. Advised mom to bring the patient to the emergency room for safety and stabilization. Message sent to Dr. Denney.

## 2020-09-29 ENCOUNTER — OFFICE VISIT (OUTPATIENT)
Dept: PSYCHIATRY | Facility: CLINIC | Age: 35
End: 2020-09-29
Payer: MEDICAID

## 2020-09-29 DIAGNOSIS — F31.32 BIPOLAR AFFECTIVE DISORDER, CURRENTLY DEPRESSED, MODERATE: ICD-10-CM

## 2020-09-29 PROCEDURE — 99213 OFFICE O/P EST LOW 20 MIN: CPT | Mod: 95,AF,HB, | Performed by: PSYCHIATRY & NEUROLOGY

## 2020-09-29 PROCEDURE — 99213 PR OFFICE/OUTPT VISIT, EST, LEVL III, 20-29 MIN: ICD-10-PCS | Mod: 95,AF,HB, | Performed by: PSYCHIATRY & NEUROLOGY

## 2020-09-29 PROCEDURE — 90833 PSYTX W PT W E/M 30 MIN: CPT | Mod: 95,AF,HB, | Performed by: PSYCHIATRY & NEUROLOGY

## 2020-09-29 PROCEDURE — 90833 PR PSYCHOTHERAPY W/PATIENT W/E&M, 30 MIN (ADD ON): ICD-10-PCS | Mod: 95,AF,HB, | Performed by: PSYCHIATRY & NEUROLOGY

## 2020-09-29 RX ORDER — OXCARBAZEPINE 600 MG/1
600 TABLET, FILM COATED ORAL 2 TIMES DAILY
Qty: 60 TABLET | Refills: 5 | Status: SHIPPED | OUTPATIENT
Start: 2020-09-29

## 2020-09-29 RX ORDER — VORTIOXETINE 20 MG/1
20 TABLET, FILM COATED ORAL DAILY
Qty: 30 TABLET | Refills: 5 | Status: SHIPPED | OUTPATIENT
Start: 2020-09-29

## 2020-09-29 RX ORDER — TADALAFIL 20 MG/1
TABLET ORAL
COMMUNITY
Start: 2020-08-01

## 2020-09-29 RX ORDER — RIZATRIPTAN BENZOATE 10 MG/1
TABLET, ORALLY DISINTEGRATING ORAL
COMMUNITY
Start: 2020-09-25

## 2020-10-13 ENCOUNTER — OFFICE VISIT (OUTPATIENT)
Dept: PSYCHIATRY | Facility: CLINIC | Age: 35
End: 2020-10-13
Payer: MEDICAID

## 2020-10-13 DIAGNOSIS — F31.32 BIPOLAR AFFECTIVE DISORDER, CURRENTLY DEPRESSED, MODERATE: Primary | ICD-10-CM

## 2020-10-13 DIAGNOSIS — I10 ESSENTIAL HYPERTENSION: ICD-10-CM

## 2020-10-13 DIAGNOSIS — F19.21 POLYSUBSTANCE DEPENDENCE IN EARLY, EARLY PARTIAL, SUSTAINED FULL, OR SUSTAINED PARTIAL REMISSION: ICD-10-CM

## 2020-10-13 PROCEDURE — 99214 PR OFFICE/OUTPT VISIT, EST, LEVL IV, 30-39 MIN: ICD-10-PCS | Mod: 95,AF,HB, | Performed by: PSYCHIATRY & NEUROLOGY

## 2020-10-13 PROCEDURE — 99214 OFFICE O/P EST MOD 30 MIN: CPT | Mod: 95,AF,HB, | Performed by: PSYCHIATRY & NEUROLOGY

## 2020-10-13 RX ORDER — DIAZEPAM 10 MG/1
10 TABLET ORAL 2 TIMES DAILY
Qty: 60 TABLET | Refills: 5 | Status: SHIPPED | OUTPATIENT
Start: 2020-10-13

## 2020-10-13 NOTE — PROGRESS NOTES
"Outpatient Psychiatry Follow-Up Visit (MD/NP)  10/13/2020    Clinical Status of Patient:  Outpatient (Ambulatory)  The patient location is: at home in [facility]  The chief complaint leading to consultation is: below  Visit type: simultaneous audio-visual  Total time spent with patient: 15 minutes  Each patient to whom he or she provides medical services by telemedicine is:  (1) informed of the relationship between the physician and patient and the respective role of any other health care provider with respect to management of the patient; and (2) notified that he or she may decline to receive medical services by telemedicine and may withdraw from such care at any time.    Chief Complaint:  Warren Berg is a 35 y.o. male who presents today for follow-up of Mood Disorder    Met with patient.    Interval History and Content of Current Session:          Doing better since last visit: anger has been much less of a problem, actually for the last 10 days not a problem at all. Tolerating the re-started Trintellix without any complaint of vertigo, dizziness, sleepiness, GI upset, or other adverse effects. Still compliant with Trintellix, he says. Complains of continued generalized worry, about things related to work, his relationship with SO, mother's health, and wants to discuss resuming diazepam despite doing without it for the past 6 months. We discussed how it is absolutely essential that he not consume alcohol at all while taking this- he replies that he does not "need" to drink alcohol when taking it because he is so much more calm and relaxed. Denies any other current substance use. As in the past, also denies any current structured addictions treatment like AA and is uninterested/unwilling to do any.     Review of Systems   History obtained from patient.  General ROS: negative for - fatigue and weight loss. Morbidly obese  Psychological ROS: positive for - behavioral disorder  negative for - concentration " difficulties, depression, hallucinations, memory difficulties, physical abuse, sexual abuse or suicidal ideation  Ophthalmic ROS: negative for - decreased vision or dry eyes  ENT ROS: negative for - epistaxis, nasal congestion or oral lesions  Hematological and Lymphatic ROS: negative for - bruising, jaundice or pallor  Endocrine ROS: negative for - change in hair pattern, galactorrhea, skin changes or temperature intolerance  Respiratory ROS: no cough, shortness of breath, or wheezing. Using prescribed inhalers  Cardiovascular ROS: no chest pain or palpitations. Discussed his HTN and poor compliance with antihypertensive  Gastrointestinal ROS: no abdominal pain, change in bowel habits  Urinary ROS: no dysuria, trouble voiding or hematuria  Musculoskeletal ROS: negative for - joint pain, muscle pain or excess muscle tone  Neurological ROS: negative for - headaches, seizures, tremors, tics, or other AIMs  Dermatological ROS: negative for pruritus. Positive for facial rosacea  .   Past Medical History:   Diagnosis Date    ADHD (attention deficit hyperactivity disorder)     Bipolar affective disorder, depressed in partial remission     Class 3 severe obesity without serious comorbidity with body mass index (BMI) of 40.0 to 44.9 in adult 12/6/2019    Essential hypertension 12/6/2019    Angela     Migraine      Medications  Current Outpatient Medications on File Prior to Visit   Medication Sig Dispense Refill    DULERA 200-5 mcg/actuation inhaler Inhale 1 puff into the lungs 2 (two) times daily.  0    losartan-hydrochlorothiazide 100-12.5 mg (HYZAAR) 100-12.5 mg Tab Take 1 tablet by mouth.       OXcarbazepine (TRILEPTAL) 600 MG Tab Take 1 tablet (600 mg total) by mouth 2 (two) times daily. 60 tablet 5     vortioxetine (TRINTELLIX) 10 mg Tab Take 1 tablet (10 mg total) by mouth once daily. 30 tablet 5     No current facility-administered medications on file prior to visit.      Compliance: good for past 2 weeks  of Trintellix and Trileptal  Side effects: none that he thinks are from his prescribed meds.    Risk Parameters:  Patient reports no suicidal ideation  Patient reports no homicidal ideation  Patient reports no self-injurious behavior  Patient reports no violent behavior    EXAM:   vitals were not taken for this visit.    Mental Status Evaluation     Appearance:  Obese, casually dressed, fair ogrooming   Behavior:  cooperative, on time   Speech:  spontaneous, conversational rate and volume   Mood:  steady   Affect:  mood-congruent, midline   Thought Process:  logical   Thought Content:   no suicidality, no homicidality, delusions, or paranoia   Sensorium:  person, place, time/date, situation   Attention Span & Concentration able to focus, completed tasks   Cognition:  fund of knowledge intact and appropriate to age and level of education   Insight:  fair   Judgment:  fair except for managing relationships     Diagnosis:  1. Bipolar affective disorder, currently depressed, moderate  diazePAM (VALIUM) 10 MG Tab   2. Polysubstance dependence in early, early partial, sustained full, or sustained partial remission     3. Essential hypertension     3.      Personality disorder  III:    Hypertension    Plan:  · Medication Management:  The risks and benefits of medication were discussed with the patient. 1.continue Trintellix 20 mg daily 2.continue Trileptal 600 mg po BID. 3.do not bother to re-start lamotrigine at present 4. ok to resume diazepam 10 mg po BID   · He refuses any medication aided addiction relapse prevention intervention   · He refuses any 12 step program or ACoA  · Supportive psychotherapy    Return to Clinic: 2 months, sooner prn

## 2020-10-25 NOTE — PROGRESS NOTES
Outpatient Psychiatry Follow-Up Visit (MD/NP)  9/29/2020    Clinical Status of Patient:  Outpatient (Ambulatory)    Chief Complaint:  Warren Berg is a 35 y.o. male who presents today for follow-up of Mood Disorder, angry mood, and sleep has become less restful    Met with patient.    Interval History and Content of Current Session:          Doing better since last visit: has maintained relationship cohabiting with girlfriend who is a recovering alcoholic      PSYCHOTHERAPY  Site: [facility]  Time: 21 minutes  Participants: Met with patient  Therapeutic Intervention Type: supportive psychotherapy, motivational interviewing  Why chosen therapy is appropriate versus another modality: patient responds to this modality  Target symptoms: addiction recovery, remorse, depression risk  Primary focus: addiction relapse prevention/risk reduction  Psychotherapeutic techniques: ventilation, clarification, education  Outcome monitoring methods: self-report, observation, feedback from family  Patient's response to intervention:  The patient's response to intervention is  motivated.  Progress toward goals:  The patient's progress toward goals is good.    Review of Systems   History obtained from patient.  General ROS: negative for - fatigue and weight loss. He has lost 11 lb intentionally since last visit. Morbidly obese  Psychological ROS: positive for - behavioral disorder  negative for - concentration difficulties, depression, hallucinations, memory difficulties, physical abuse, sexual abuse or suicidal ideation  Ophthalmic ROS: negative for - decreased vision or dry eyes  ENT ROS: negative for - epistaxis, nasal congestion or oral lesions  Hematological and Lymphatic ROS: negative for - bruising, jaundice or pallor  Endocrine ROS: negative for - change in hair pattern, galactorrhea, skin changes or temperature intolerance  Respiratory ROS: no cough, shortness of breath, or wheezing. Using  albuterol inhaler prn  Cardiovascular ROS: no chest pain or palpitations. Discussed his HTN and poor compliance with antihypertensive  Gastrointestinal ROS: no abdominal pain, change in bowel habits  Urinary ROS: no dysuria, trouble voiding or hematuria  Musculoskeletal ROS: negative for - joint pain, muscle pain or excess muscle tone  Neurological ROS: negative for - headaches, seizures, tremors, tics, or other AIMs  Dermatological ROS: negative for pruritus. Positive for facial rosacea  .   Past Medical History:   Diagnosis Date    ADHD (attention deficit hyperactivity disorder)     Bipolar affective disorder, depressed in partial remission     Class 3 severe obesity without serious comorbidity with body mass index (BMI) of 40.0 to 44.9 in adult 12/6/2019    Essential hypertension 12/6/2019    Angela     Migraine      Medications  Current Outpatient Medications on File Prior to Visit   Medication Sig Dispense Refill    DULERA 200-5 mcg/actuation inhaler Inhale 1 puff into the lungs 2 (two) times daily.  0    losartan-hydrochlorothiazide 100-12.5 mg (HYZAAR) 100-12.5 mg Tab Take 1 tablet by mouth.      diazePAM (VALIUM) 10 MG Tab Take 1 tablet (10 mg total) by mouth 2 (two) times daily. 60 tablet 5     lamoTRIgine (LAMICTAL) 200 MG tablet Take 2 tablets (400 mg total) by mouth once daily. 60 tablet 5     OXcarbazepine (TRILEPTAL) 600 MG Tab Take 1 tablet (600 mg total) by mouth 2 (two) times daily. 60 tablet 5     vortioxetine (TRINTELLIX) 10 mg Tab Take 1 tablet (10 mg total) by mouth once daily. 30 tablet 5     No current facility-administered medications on file prior to visit.      Compliance: good for his psychotropics except for diazepam which he has been out of; poorly compliant with losartan  Side effects: none that he thinks are from his prescribed meds.    Risk Parameters:  Patient reports no suicidal ideation  Patient reports no homicidal ideation  Patient reports no self-injurious  behavior  Patient reports no violent behavior    EXAM:   vitals were not taken for this visit.    Mental Status Evaluation     Appearance:  Obese, casually dressed, poor grooming   Behavior:  cooperative, 10 min late, accompanied by GF   Speech:  spontaneous, conversational rate and volume   Mood:  anxious   Affect:  mood-congruent, restricted, decreased range   Thought Process:  circumstantial   Thought Content:  normal, no suicidality, no homicidality, delusions, or paranoia   Sensorium:  person, place, time/date, situation   Attention Span & Concentration able to focus, completed tasks   Cognition:  fund of knowledge intact and appropriate to age and level of education   Insight:  fair   Judgment:  fair except for managing relationships     Diagnosis:  1. Bipolar affective disorder, currently depressed, moderate  vortioxetine (TRINTELLIX) 20 mg Tab    OXcarbazepine (TRILEPTAL) 600 MG Tab   3.      Personality disorder  III:    Hypertension    Plan:  · Medication Management:  1. increase Trintellix to 20 mg daily 2.continue Trileptal 600 mg po BID. 3.no change in lamotrigine/diazepam   · He refuses any medication aided addiction relapse prevention intervention   · He refuses any 12 step program or ACoA  · Supportive psychotherapy    Return to Clinic: 3 months, sooner prn

## 2021-04-28 ENCOUNTER — PATIENT MESSAGE (OUTPATIENT)
Dept: RESEARCH | Facility: HOSPITAL | Age: 36
End: 2021-04-28